# Patient Record
Sex: FEMALE | Race: WHITE | NOT HISPANIC OR LATINO | Employment: OTHER | URBAN - METROPOLITAN AREA
[De-identification: names, ages, dates, MRNs, and addresses within clinical notes are randomized per-mention and may not be internally consistent; named-entity substitution may affect disease eponyms.]

---

## 2019-02-06 ENCOUNTER — OFFICE VISIT (OUTPATIENT)
Dept: GASTROENTEROLOGY | Facility: CLINIC | Age: 69
End: 2019-02-06

## 2019-02-06 VITALS
HEART RATE: 89 BPM | BODY MASS INDEX: 21.33 KG/M2 | SYSTOLIC BLOOD PRESSURE: 118 MMHG | DIASTOLIC BLOOD PRESSURE: 84 MMHG | HEIGHT: 65 IN | WEIGHT: 128 LBS

## 2019-02-06 DIAGNOSIS — K22.70 BARRETT'S ESOPHAGUS WITHOUT DYSPLASIA: Primary | ICD-10-CM

## 2019-02-06 DIAGNOSIS — K59.1 FUNCTIONAL DIARRHEA: ICD-10-CM

## 2019-02-06 PROCEDURE — 99204 OFFICE O/P NEW MOD 45 MIN: CPT | Performed by: INTERNAL MEDICINE

## 2019-02-06 RX ORDER — ESOMEPRAZOLE MAGNESIUM 40 MG/1
40 CAPSULE, DELAYED RELEASE ORAL DAILY
Qty: 90 CAPSULE | Refills: 3 | Status: SHIPPED | OUTPATIENT
Start: 2019-02-06 | End: 2020-03-09 | Stop reason: SDUPTHER

## 2019-02-06 RX ORDER — ALOSETRON HYDROCHLORIDE 0.5 MG/1
0.5 TABLET, FILM COATED ORAL 2 TIMES DAILY
Qty: 60 TABLET | Refills: 11 | Status: SHIPPED | OUTPATIENT
Start: 2019-02-06 | End: 2020-02-06

## 2019-02-06 NOTE — PROGRESS NOTES
PATIENT INFORMATION  Lakeisha Gunn       - 1950    CHIEF COMPLAINT  Chief Complaint   Patient presents with   • Heartburn   • Diarrhea       HISTORY OF PRESENT ILLNESS  Wants second opinion for Diarrhea  And abd pain. And will skip a day here and there but can go 3-5 times a day an at least 4 years.     Has had a colon with biopsy so no IBDand no Microscopic colitis.And ha had HP treated and retested negative.    Post prandial bloating is persistant and has of course taken ABx for her Hp and is now on them for her ear surgery    Has documented Washington's bu is not on meds and has had NCCP that has awoken her from sleep    Reviewed her Path and had persistent inflammation and took Nexium the longest in the past            REVIEW OF SYSTEMS  Review of Systems   Gastrointestinal: Positive for abdominal pain and diarrhea.        Reflux   All other systems reviewed and are negative.        ACTIVE PROBLEMS  There are no active problems to display for this patient.        PAST MEDICAL HISTORY  Past Medical History:   Diagnosis Date   • GERD (gastroesophageal reflux disease)          SURGICAL HISTORY  Past Surgical History:   Procedure Laterality Date   • COLONOSCOPY     • EXTERNAL EAR SURGERY     • NISSEN FUNDOPLICATION           FAMILY HISTORY  Family History   Adopted: Yes         SOCIAL HISTORY  Social History     Occupational History   • Not on file   Tobacco Use   • Smoking status: Never Smoker   • Smokeless tobacco: Never Used   Substance and Sexual Activity   • Alcohol use: Yes   • Drug use: Not on file   • Sexual activity: Not on file       Debilities/Disabilities Identified: None    Emotional Behavior: Appropriate    CURRENT MEDICATIONS    Current Outpatient Medications:   •  alosetron (LOTRONEX) 0.5 MG tablet, Take 1 tablet by mouth 2 (Two) Times a Day., Disp: 60 tablet, Rfl: 11  •  esomeprazole (nexIUM) 40 MG capsule, Take 1 capsule by mouth Daily., Disp: 90 capsule, Rfl: 3    ALLERGIES  Patient has no  "known allergies.    VITALS  Vitals:    02/06/19 0957   BP: 118/84   Pulse: 89   Weight: 58.1 kg (128 lb)   Height: 165.1 cm (65\")       LAST RESULTS   No results found for any previous visit.     No results found.    PHYSICAL EXAM  Physical Exam   Constitutional: She is oriented to person, place, and time. She appears well-developed and well-nourished.   HENT:   Head: Normocephalic and atraumatic.   Eyes: Conjunctivae and EOM are normal. Pupils are equal, round, and reactive to light. No scleral icterus.   Neck: Normal range of motion. Neck supple. No thyromegaly present.   Cardiovascular: Normal rate, regular rhythm, normal heart sounds and intact distal pulses. Exam reveals no gallop.   No murmur heard.  Pulmonary/Chest: Effort normal and breath sounds normal. She has no wheezes. She has no rales.   Abdominal: Soft. Bowel sounds are normal. She exhibits no shifting dullness, no distension, no fluid wave, no abdominal bruit, no ascites and no mass. There is no hepatosplenomegaly. There is tenderness in the right upper quadrant, epigastric area and periumbilical area. There is no guarding and negative Rust's sign. Hernia confirmed negative in the ventral area.       Musculoskeletal: Normal range of motion. She exhibits no edema.   Lymphadenopathy:     She has no cervical adenopathy.   Neurological: She is alert and oriented to person, place, and time.   Skin: Skin is warm and dry. No rash noted. She is not diaphoretic. No erythema.   Psychiatric: She has a normal mood and affect. Her behavior is normal.       ASSESSMENT  Diagnoses and all orders for this visit:    Washington's esophagus without dysplasia    Functional diarrhea    Other orders  -     esomeprazole (nexIUM) 40 MG capsule; Take 1 capsule by mouth Daily.  -     alosetron (LOTRONEX) 0.5 MG tablet; Take 1 tablet by mouth 2 (Two) Times a Day.          PLAN  Return in about 2 months (around 4/6/2019).                          "

## 2019-04-16 ENCOUNTER — OFFICE VISIT (OUTPATIENT)
Dept: GASTROENTEROLOGY | Facility: CLINIC | Age: 69
End: 2019-04-16

## 2019-04-16 VITALS
BODY MASS INDEX: 21.3 KG/M2 | HEART RATE: 80 BPM | DIASTOLIC BLOOD PRESSURE: 89 MMHG | HEIGHT: 65 IN | SYSTOLIC BLOOD PRESSURE: 146 MMHG

## 2019-04-16 DIAGNOSIS — K58.0 IRRITABLE BOWEL SYNDROME WITH DIARRHEA: ICD-10-CM

## 2019-04-16 DIAGNOSIS — K22.70 BARRETT'S ESOPHAGUS WITHOUT DYSPLASIA: Primary | ICD-10-CM

## 2019-04-16 PROCEDURE — 99213 OFFICE O/P EST LOW 20 MIN: CPT | Performed by: INTERNAL MEDICINE

## 2019-04-16 RX ORDER — ESOMEPRAZOLE MAGNESIUM 40 MG/1
40 CAPSULE, DELAYED RELEASE ORAL 2 TIMES DAILY
Qty: 180 CAPSULE | Refills: 3 | Status: SHIPPED | OUTPATIENT
Start: 2019-04-16 | End: 2020-03-09

## 2019-04-16 RX ORDER — ESOMEPRAZOLE MAGNESIUM 40 MG/1
40 CAPSULE, DELAYED RELEASE ORAL 2 TIMES DAILY
Qty: 180 CAPSULE | Refills: 3 | Status: SHIPPED | OUTPATIENT
Start: 2019-04-16 | End: 2020-03-09 | Stop reason: SDUPTHER

## 2019-04-16 NOTE — PROGRESS NOTES
PATIENT INFORMATION  Lakeisha Gunn       - 1950    CHIEF COMPLAINT  Chief Complaint   Patient presents with   • Follow-up     2 mo follow up on Washington's       HISTORY OF PRESENT ILLNESS  Follow up from Washington's and IBS-D , had started daily PPI and Lotronex. Her IBs is better only one bout of Diarrhea. So the respponse to Lotronex has been great.    Her reflux however is down to persistant constant burning and not worse at night or after meals so will trial BID meds for now prior to her EGD    Her last EGd was 2017             REVIEW OF SYSTEMS  Review of Systems   Gastrointestinal:        Reflux   All other systems reviewed and are negative.        ACTIVE PROBLEMS  Patient Active Problem List    Diagnosis   • Washington's esophagus without dysplasia [K22.70]   • Irritable bowel syndrome with diarrhea [K58.0]         PAST MEDICAL HISTORY  Past Medical History:   Diagnosis Date   • GERD (gastroesophageal reflux disease)          SURGICAL HISTORY  Past Surgical History:   Procedure Laterality Date   • COLONOSCOPY     • EXTERNAL EAR SURGERY     • NISSEN FUNDOPLICATION           FAMILY HISTORY  Family History   Adopted: Yes   Problem Relation Age of Onset   • Colon cancer Neg Hx    • Colon polyps Neg Hx          SOCIAL HISTORY  Social History     Occupational History   • Not on file   Tobacco Use   • Smoking status: Never Smoker   • Smokeless tobacco: Never Used   Substance and Sexual Activity   • Alcohol use: Yes   • Drug use: Not on file   • Sexual activity: Not on file       Debilities/Disabilities Identified: None    Emotional Behavior: Appropriate    CURRENT MEDICATIONS    Current Outpatient Medications:   •  alosetron (LOTRONEX) 0.5 MG tablet, Take 1 tablet by mouth 2 (Two) Times a Day., Disp: 60 tablet, Rfl: 11  •  esomeprazole (nexIUM) 40 MG capsule, Take 1 capsule by mouth Daily., Disp: 90 capsule, Rfl: 3  •  esomeprazole (nexIUM) 40 MG capsule, Take 1 capsule by mouth 2 (Two) Times a Day., Disp: 180  "capsule, Rfl: 3  •  esomeprazole (nexIUM) 40 MG capsule, Take 1 capsule by mouth 2 (Two) Times a Day., Disp: 180 capsule, Rfl: 3    ALLERGIES  Patient has no known allergies.    VITALS  Vitals:    04/16/19 0907   BP: 146/89   Pulse: 80   Height: 165.1 cm (65\")       LAST RESULTS   No results found for any previous visit.     No results found.    PHYSICAL EXAM  Physical Exam   Constitutional: She is oriented to person, place, and time. She appears well-developed and well-nourished.   Eyes: Conjunctivae and EOM are normal. Pupils are equal, round, and reactive to light. No scleral icterus.   Neck: Normal range of motion. Neck supple. No thyromegaly present.   Cardiovascular: Normal rate, regular rhythm, normal heart sounds and intact distal pulses. Exam reveals no gallop.   No murmur heard.  Pulmonary/Chest: Effort normal and breath sounds normal. She has no wheezes. She has no rales.   Abdominal: Soft. Bowel sounds are normal. She exhibits no shifting dullness, no distension, no fluid wave, no abdominal bruit, no ascites and no mass. There is no hepatosplenomegaly. There is tenderness in the periumbilical area. There is no guarding and negative Rust's sign. Hernia confirmed negative in the ventral area.   Musculoskeletal: Normal range of motion. She exhibits no edema.   Lymphadenopathy:     She has no cervical adenopathy.   Neurological: She is alert and oriented to person, place, and time.   Skin: Skin is warm and dry. No rash noted. She is not diaphoretic. No erythema.       ASSESSMENT  Diagnoses and all orders for this visit:    Washington's esophagus without dysplasia    Irritable bowel syndrome with diarrhea    Other orders  -     esomeprazole (nexIUM) 40 MG capsule; Take 1 capsule by mouth 2 (Two) Times a Day.  -     esomeprazole (nexIUM) 40 MG capsule; Take 1 capsule by mouth 2 (Two) Times a Day.          PLAN  Her recall for EGD in 2020 unless she fails to respond to her present meds    Return in about 2 " months (around 6/16/2019).

## 2019-06-18 ENCOUNTER — OFFICE VISIT (OUTPATIENT)
Dept: GASTROENTEROLOGY | Facility: CLINIC | Age: 69
End: 2019-06-18

## 2019-06-18 VITALS
BODY MASS INDEX: 21.34 KG/M2 | DIASTOLIC BLOOD PRESSURE: 96 MMHG | HEART RATE: 81 BPM | WEIGHT: 128.09 LBS | HEIGHT: 65 IN | SYSTOLIC BLOOD PRESSURE: 134 MMHG

## 2019-06-18 DIAGNOSIS — K58.0 IRRITABLE BOWEL SYNDROME WITH DIARRHEA: ICD-10-CM

## 2019-06-18 DIAGNOSIS — K22.70 BARRETT'S ESOPHAGUS WITHOUT DYSPLASIA: Primary | ICD-10-CM

## 2019-06-18 PROCEDURE — 99213 OFFICE O/P EST LOW 20 MIN: CPT | Performed by: INTERNAL MEDICINE

## 2019-06-18 NOTE — PROGRESS NOTES
PATIENT INFORMATION  Lakeisha Gunn       - 1950    CHIEF COMPLAINT  Chief Complaint   Patient presents with   • Follow-up     2 mo follow up on Washington's       HISTORY OF PRESENT ILLNESS  Complains of mild constipation on her Lotronex and also no breakthrough since she increased to BID PPI so all is well and no dysphagia            REVIEW OF SYSTEMS  Review of Systems   Gastrointestinal: Positive for constipation.   All other systems reviewed and are negative.        ACTIVE PROBLEMS  Patient Active Problem List    Diagnosis   • Washington's esophagus without dysplasia [K22.70]   • Irritable bowel syndrome with diarrhea [K58.0]         PAST MEDICAL HISTORY  Past Medical History:   Diagnosis Date   • GERD (gastroesophageal reflux disease)          SURGICAL HISTORY  Past Surgical History:   Procedure Laterality Date   • COLONOSCOPY     • EXTERNAL EAR SURGERY     • NISSEN FUNDOPLICATION           FAMILY HISTORY  Family History   Adopted: Yes   Problem Relation Age of Onset   • Colon cancer Neg Hx    • Colon polyps Neg Hx          SOCIAL HISTORY  Social History     Occupational History   • Not on file   Tobacco Use   • Smoking status: Never Smoker   • Smokeless tobacco: Never Used   Substance and Sexual Activity   • Alcohol use: Yes   • Drug use: Not on file   • Sexual activity: Not on file       Debilities/Disabilities Identified: None    Emotional Behavior: Appropriate    CURRENT MEDICATIONS    Current Outpatient Medications:   •  alosetron (LOTRONEX) 0.5 MG tablet, Take 1 tablet by mouth 2 (Two) Times a Day., Disp: 60 tablet, Rfl: 11  •  esomeprazole (nexIUM) 40 MG capsule, Take 1 capsule by mouth Daily., Disp: 90 capsule, Rfl: 3  •  esomeprazole (nexIUM) 40 MG capsule, Take 1 capsule by mouth 2 (Two) Times a Day., Disp: 180 capsule, Rfl: 3  •  esomeprazole (nexIUM) 40 MG capsule, Take 1 capsule by mouth 2 (Two) Times a Day., Disp: 180 capsule, Rfl: 3    ALLERGIES  Patient has no known  "allergies.    VITALS  Vitals:    06/18/19 0848   BP: 134/96   Pulse: 81   Weight: 58.1 kg (128 lb 1.4 oz)   Height: 165.1 cm (65\")       LAST RESULTS   No results found for any previous visit.     No results found.    PHYSICAL EXAM  Physical Exam   Constitutional: She is oriented to person, place, and time. She appears well-developed and well-nourished.   HENT:   Head: Normocephalic and atraumatic.   Eyes: Conjunctivae and EOM are normal. Pupils are equal, round, and reactive to light. No scleral icterus.   Neck: Normal range of motion. Neck supple. No thyromegaly present.   Cardiovascular: Normal rate, regular rhythm, normal heart sounds and intact distal pulses. Exam reveals no gallop.   No murmur heard.  Pulmonary/Chest: Effort normal and breath sounds normal. She has no wheezes. She has no rales.   Abdominal: Soft. Bowel sounds are normal. She exhibits no shifting dullness, no distension, no fluid wave, no abdominal bruit, no ascites and no mass. There is no hepatosplenomegaly. There is no tenderness. There is no guarding and negative Rust's sign. Hernia confirmed negative in the ventral area.   Musculoskeletal: Normal range of motion. She exhibits no edema.   Lymphadenopathy:     She has no cervical adenopathy.   Neurological: She is alert and oriented to person, place, and time.   Skin: Skin is warm and dry. No rash noted. She is not diaphoretic. No erythema.   Psychiatric: She has a normal mood and affect. Her behavior is normal.       ASSESSMENT  Diagnoses and all orders for this visit:    Washington's esophagus without dysplasia    Irritable bowel syndrome with diarrhea          PLAN  Recall in 9/2020 for EGD and her Colonoscopy 10 year recall from 9/2017- 2027    Return if symptoms worsen or fail to improve.                          "

## 2019-08-13 ENCOUNTER — APPOINTMENT (OUTPATIENT)
Dept: WOMENS IMAGING | Facility: HOSPITAL | Age: 69
End: 2019-08-13

## 2019-08-13 PROCEDURE — 77067 SCR MAMMO BI INCL CAD: CPT | Performed by: RADIOLOGY

## 2020-03-09 RX ORDER — ESOMEPRAZOLE MAGNESIUM 40 MG/1
CAPSULE, DELAYED RELEASE ORAL
Qty: 180 CAPSULE | Refills: 3 | Status: SHIPPED | OUTPATIENT
Start: 2020-03-09 | End: 2021-03-01

## 2020-04-20 RX ORDER — ALOSETRON HYDROCHLORIDE 0.5 MG/1
0.5 TABLET, FILM COATED ORAL 2 TIMES DAILY
Qty: 180 TABLET | Refills: 3 | Status: SHIPPED | OUTPATIENT
Start: 2020-04-20 | End: 2021-04-19

## 2020-08-17 ENCOUNTER — TELEPHONE (OUTPATIENT)
Dept: GASTROENTEROLOGY | Facility: CLINIC | Age: 70
End: 2020-08-17

## 2020-08-17 ENCOUNTER — PREP FOR SURGERY (OUTPATIENT)
Dept: OTHER | Facility: HOSPITAL | Age: 70
End: 2020-08-17

## 2020-08-17 DIAGNOSIS — K22.70 BARRETT'S ESOPHAGUS WITHOUT DYSPLASIA: Primary | ICD-10-CM

## 2020-08-17 NOTE — TELEPHONE ENCOUNTER
FAST TRACK (IN MEDIA) - 3 YEAR RECALL - LAST EGD 09/27/2017 - GASTRITIS (?) - SCHEDULE AT [facility].    COLONOSCOPY RECALL 08/2027.

## 2020-08-18 NOTE — TELEPHONE ENCOUNTER
RETURN CALL FROM PATIENT.  SCHEDULED AT [facility] 10/13/2020 AT 2:15PM - ARRIVE 1PM.  WILL MAIL INSTRUCTIONS.    EXPLAINED ABOUT THE COVID TEST ON 10/10/2020, THEN SELF QUARANTINE UNTIL AFTER PROCEDURE.  SHE UNDERSTANDS.

## 2020-09-25 ENCOUNTER — TRANSCRIBE ORDERS (OUTPATIENT)
Dept: SLEEP MEDICINE | Facility: HOSPITAL | Age: 70
End: 2020-09-25

## 2020-09-25 DIAGNOSIS — Z01.818 OTHER SPECIFIED PRE-OPERATIVE EXAMINATION: Primary | ICD-10-CM

## 2020-10-10 ENCOUNTER — LAB (OUTPATIENT)
Dept: LAB | Facility: HOSPITAL | Age: 70
End: 2020-10-10

## 2020-10-10 DIAGNOSIS — Z01.818 OTHER SPECIFIED PRE-OPERATIVE EXAMINATION: ICD-10-CM

## 2020-10-10 PROCEDURE — U0004 COV-19 TEST NON-CDC HGH THRU: HCPCS

## 2020-10-10 PROCEDURE — C9803 HOPD COVID-19 SPEC COLLECT: HCPCS

## 2020-10-12 LAB — SARS-COV-2 RNA RESP QL NAA+PROBE: NOT DETECTED

## 2020-10-13 ENCOUNTER — HOSPITAL ENCOUNTER (OUTPATIENT)
Facility: HOSPITAL | Age: 70
Setting detail: HOSPITAL OUTPATIENT SURGERY
Discharge: HOME | End: 2020-10-13
Attending: INTERNAL MEDICINE | Admitting: INTERNAL MEDICINE

## 2020-10-13 ENCOUNTER — ANESTHESIA (OUTPATIENT)
Dept: GASTROENTEROLOGY | Facility: HOSPITAL | Age: 70
End: 2020-10-13

## 2020-10-13 ENCOUNTER — ANESTHESIA EVENT (OUTPATIENT)
Dept: GASTROENTEROLOGY | Facility: HOSPITAL | Age: 70
End: 2020-10-13

## 2020-10-13 VITALS
HEART RATE: 73 BPM | BODY MASS INDEX: 21.59 KG/M2 | HEIGHT: 65 IN | OXYGEN SATURATION: 98 % | RESPIRATION RATE: 16 BRPM | TEMPERATURE: 97.7 F | WEIGHT: 129.6 LBS | SYSTOLIC BLOOD PRESSURE: 124 MMHG | DIASTOLIC BLOOD PRESSURE: 77 MMHG

## 2020-10-13 DIAGNOSIS — K22.70 BARRETT'S ESOPHAGUS WITHOUT DYSPLASIA: ICD-10-CM

## 2020-10-13 PROCEDURE — 43239 EGD BIOPSY SINGLE/MULTIPLE: CPT | Performed by: INTERNAL MEDICINE

## 2020-10-13 PROCEDURE — 88305 TISSUE EXAM BY PATHOLOGIST: CPT | Performed by: INTERNAL MEDICINE

## 2020-10-13 PROCEDURE — 25010000002 PROPOFOL 10 MG/ML EMULSION: Performed by: ANESTHESIOLOGY

## 2020-10-13 RX ORDER — LIDOCAINE HYDROCHLORIDE 20 MG/ML
INJECTION, SOLUTION INFILTRATION; PERINEURAL AS NEEDED
Status: DISCONTINUED | OUTPATIENT
Start: 2020-10-13 | End: 2020-10-13 | Stop reason: SURG

## 2020-10-13 RX ORDER — SODIUM CHLORIDE 0.9 % (FLUSH) 0.9 %
3 SYRINGE (ML) INJECTION EVERY 12 HOURS SCHEDULED
Status: DISCONTINUED | OUTPATIENT
Start: 2020-10-13 | End: 2020-10-13 | Stop reason: HOSPADM

## 2020-10-13 RX ORDER — PROPOFOL 10 MG/ML
VIAL (ML) INTRAVENOUS CONTINUOUS PRN
Status: DISCONTINUED | OUTPATIENT
Start: 2020-10-13 | End: 2020-10-13 | Stop reason: SURG

## 2020-10-13 RX ORDER — PROPOFOL 10 MG/ML
VIAL (ML) INTRAVENOUS AS NEEDED
Status: DISCONTINUED | OUTPATIENT
Start: 2020-10-13 | End: 2020-10-13 | Stop reason: SURG

## 2020-10-13 RX ORDER — SODIUM CHLORIDE 0.9 % (FLUSH) 0.9 %
10 SYRINGE (ML) INJECTION AS NEEDED
Status: DISCONTINUED | OUTPATIENT
Start: 2020-10-13 | End: 2020-10-13 | Stop reason: HOSPADM

## 2020-10-13 RX ORDER — SODIUM CHLORIDE, SODIUM LACTATE, POTASSIUM CHLORIDE, CALCIUM CHLORIDE 600; 310; 30; 20 MG/100ML; MG/100ML; MG/100ML; MG/100ML
30 INJECTION, SOLUTION INTRAVENOUS CONTINUOUS PRN
Status: DISCONTINUED | OUTPATIENT
Start: 2020-10-13 | End: 2020-10-13 | Stop reason: HOSPADM

## 2020-10-13 RX ORDER — ROSUVASTATIN CALCIUM 5 MG/1
5 TABLET, COATED ORAL NIGHTLY
COMMUNITY
End: 2022-01-12 | Stop reason: SDUPTHER

## 2020-10-13 RX ADMIN — PROPOFOL 100 MG: 10 INJECTION, EMULSION INTRAVENOUS at 14:31

## 2020-10-13 RX ADMIN — LIDOCAINE HYDROCHLORIDE 60 MG: 20 INJECTION, SOLUTION INFILTRATION; PERINEURAL at 14:30

## 2020-10-13 RX ADMIN — PROPOFOL 160 MCG/KG/MIN: 10 INJECTION, EMULSION INTRAVENOUS at 14:32

## 2020-10-13 RX ADMIN — SODIUM CHLORIDE, POTASSIUM CHLORIDE, SODIUM LACTATE AND CALCIUM CHLORIDE 30 ML/HR: 600; 310; 30; 20 INJECTION, SOLUTION INTRAVENOUS at 13:56

## 2020-10-13 NOTE — ANESTHESIA POSTPROCEDURE EVALUATION
"Patient: Lakeisha Gunn    Procedure Summary     Date: 10/13/20 Room / Location:  [facility]    Anesthesia Start: 1428 Anesthesia Stop: 1450    Procedure: ESOPHAGOGASTRODUODENOSCOPY WITH BIOPSIES (N/A Esophagus) Diagnosis:       Washington's esophagus without dysplasia      History of fundoplication      Gastritis      (Washington's esophagus without dysplasia [K22.70])    Surgeon: Noe Salinas MD Provider: Andrea Coburn MD    Anesthesia Type: MAC ASA Status: 2          Anesthesia Type: MAC    Vitals  Vitals Value Taken Time   /81 10/13/20 1459   Temp     Pulse 74 10/13/20 1459   Resp     SpO2 98 % 10/13/20 1459           Post Anesthesia Care and Evaluation    Patient location during evaluation: bedside  Patient participation: complete - patient participated  Level of consciousness: awake and alert  Pain management: adequate  Anesthetic complications: No anesthetic complications    Cardiovascular status: acceptable  Respiratory status: acceptable  Hydration status: acceptable    Comments: /77 (BP Location: Left arm, Patient Position: Lying)   Pulse 73   Temp 36.5 °C (97.7 °F) (Oral)   Resp 16   Ht 165.1 cm (65\")   Wt 58.8 kg (129 lb 9.6 oz)   SpO2 98%   BMI 21.57 kg/m²         "

## 2020-10-13 NOTE — BRIEF OP NOTE
ESOPHAGOGASTRODUODENOSCOPY  Progress Note    Lakeisha Gunn  10/13/2020    Pre-op Diagnosis:   Washington's esophagus without dysplasia [K22.70]       Post-Op Diagnosis Codes:     * Washington's esophagus without dysplasia [K22.70]     * History of fundoplication [Z98.890]     * Gastritis [K29.70]    Procedure/CPT® Codes:        Procedure(s):  ESOPHAGOGASTRODUODENOSCOPY WITH BIOPSIES    Surgeon(s):  Noe Salinas MD    Anesthesia: Monitored Anesthesia Care    Staff:   Endo Technician: Kerri Conteh  Endo Nurse: Tracy Ramirez RN         Estimated Blood Loss: none    Urine Voided: * No values recorded between 10/13/2020  2:26 PM and 10/13/2020  2:43 PM *    Specimens:                Specimens     ID Source Type Tests Collected By Collected At Frozen?      A Stomach Tissue · TISSUE PATHOLOGY EXAM   Noe Salinas MD 10/13/20 1439      Description: GASTRIC BIOPSIES    B Esophagus, Distal Tissue · TISSUE PATHOLOGY EXAM   Noe Salinas MD 10/13/20 1441      Description: DISTAL ESOPHAGUS BIOPSIES                Drains: * No LDAs found *    Findings: Normal Duodenum  Mild gastritis-Biopsy  S/P Fundoplication  SSBE-Biopsy    Complications: None          Noe Salinas MD     Date: 10/13/2020  Time: 14:45 EDT

## 2020-10-13 NOTE — H&P
"Patient Care Team:  Eisenmenger, James Robert, MD as PCP - General (Family Medicine)    CHIEF COMPLAINT: Barretts Esophagus    HISTORY OF PRESENT ILLNESS:  Last EGD was 2017    Past Medical History:   Diagnosis Date   • GERD (gastroesophageal reflux disease)    • Hyperlipidemia      Past Surgical History:   Procedure Laterality Date   • APPENDECTOMY     • COLONOSCOPY     • EAR MASTOIDECTOMY W/ COCHLEAR IMPLANT W/ LANDMARK Left    • EXTERNAL EAR SURGERY     • NISSEN FUNDOPLICATION     • TONSILLECTOMY       Family History   Adopted: Yes   Problem Relation Age of Onset   • Colon cancer Neg Hx    • Colon polyps Neg Hx      Social History     Tobacco Use   • Smoking status: Never Smoker   • Smokeless tobacco: Never Used   Substance Use Topics   • Alcohol use: Yes   • Drug use: Not on file     Medications Prior to Admission   Medication Sig Dispense Refill Last Dose   • alosetron (LOTRONEX) 0.5 MG tablet Take 1 tablet by mouth 2 (Two) Times a Day. 180 tablet 3 10/13/2020 at Unknown time   • esomeprazole (nexIUM) 40 MG capsule TAKE 1 CAPSULE TWICE A  capsule 3 10/13/2020 at Unknown time   • rosuvastatin (CRESTOR) 5 MG tablet Take 5 mg by mouth Every Night.   10/12/2020 at Unknown time     Allergies:  Patient has no known allergies.    REVIEW OF SYSTEMS:  Please see the above history of present illness for pertinent positives and negatives.  The remainder of the patient's systems have been reviewed and are negative.     Vital Signs  Temp:  [97.7 °F (36.5 °C)] 97.7 °F (36.5 °C)  Heart Rate:  [73] 73  Resp:  [16] 16  BP: (122)/(77) 122/77    Flowsheet Rows      First Filed Value   Admission Height  165.1 cm (65\") Documented at 10/13/2020 1343   Admission Weight  58.8 kg (129 lb 9.6 oz) Documented at 10/13/2020 1343           Physical Exam:  Physical Exam   Constitutional: Patient appears well-developed and well-nourished and in no acute distress   HEENT:   Head: Normocephalic and atraumatic.   Eyes:  Pupils are equal, " round, and reactive to light. EOM are intact. Sclerae are anicteric and non-injected.  Mouth and Throat: Patient has moist mucous membranes. Oropharynx is clear of any erythema or exudate.     Neck: Neck supple. No JVD present. No thyromegaly present. No lymphadenopathy present.  Cardiovascular: Regular rate, regular rhythm, S1 normal and S2 normal.  Exam reveals no gallop and no friction rub.  No murmur heard.  Pulmonary/Chest: Lungs are clear to auscultation bilaterally. No respiratory distress. No wheezes. No rhonchi. No rales.   Abdominal: Soft. Bowel sounds are normal. No distension and no mass. There is no hepatosplenomegaly. There is no tenderness.   Musculoskeletal: Normal Muscle tone  Extremities: No edema. Pulses are palpable in all 4 extremities.  Neurological: Patient is alert and oriented to person, place, and time. Cranial nerves II-XII are grossly intact with no focal deficits.  Skin: Skin is warm. No rash noted. Nails show no clubbing.  No cyanosis or erythema.    Debilities/Disabilities Identified: None  Emotional Behavior: Appropriate     Results Review:    I reviewed the patient's new clinical results.  Lab Results (most recent)     None          Imaging Results (Most Recent)     None        reviewed    ECG/EMG Results (most recent)     None        reviewed    Assessment/Plan   Barretts Esophagus/  EGD      I discussed the patients findings and my recommendations with patient.     Noe Salinas MD  10/13/20  13:52 EDT    Time: 10 min prior to procedure.

## 2020-10-13 NOTE — ANESTHESIA PREPROCEDURE EVALUATION
Anesthesia Evaluation     NPO Solid Status: > 8 hours  NPO Liquid Status: > 2 hours           Airway   Mallampati: II  Dental      Pulmonary    Cardiovascular     (+) hyperlipidemia,       Neuro/Psych  GI/Hepatic/Renal/Endo    (+)  GERD,      ROS Comment: Barrets    Musculoskeletal     Abdominal    Substance History      OB/GYN          Other                      Anesthesia Plan    ASA 2     MAC   total IV anesthesia    Anesthetic plan, all risks, benefits, and alternatives have been provided, discussed and informed consent has been obtained with: patient.

## 2020-10-14 LAB
CYTO UR: NORMAL
LAB AP CASE REPORT: NORMAL
PATH REPORT.FINAL DX SPEC: NORMAL
PATH REPORT.GROSS SPEC: NORMAL

## 2021-03-01 RX ORDER — ESOMEPRAZOLE MAGNESIUM 40 MG/1
CAPSULE, DELAYED RELEASE ORAL
Qty: 180 CAPSULE | Refills: 3 | Status: SHIPPED | OUTPATIENT
Start: 2021-03-01 | End: 2022-02-24

## 2021-03-09 DIAGNOSIS — Z23 IMMUNIZATION DUE: ICD-10-CM

## 2021-04-19 RX ORDER — ALOSETRON HYDROCHLORIDE 0.5 MG/1
TABLET, FILM COATED ORAL
Qty: 180 TABLET | Refills: 3 | Status: SHIPPED | OUTPATIENT
Start: 2021-04-19 | End: 2022-09-13 | Stop reason: SDUPTHER

## 2021-09-20 ENCOUNTER — OFFICE VISIT (OUTPATIENT)
Dept: INTERNAL MEDICINE | Facility: CLINIC | Age: 71
End: 2021-09-20

## 2021-09-20 VITALS
DIASTOLIC BLOOD PRESSURE: 80 MMHG | BODY MASS INDEX: 21.73 KG/M2 | RESPIRATION RATE: 16 BRPM | TEMPERATURE: 98.2 F | OXYGEN SATURATION: 97 % | WEIGHT: 130.4 LBS | HEIGHT: 65 IN | HEART RATE: 89 BPM | SYSTOLIC BLOOD PRESSURE: 132 MMHG

## 2021-09-20 DIAGNOSIS — K22.70 BARRETT'S ESOPHAGUS WITHOUT DYSPLASIA: ICD-10-CM

## 2021-09-20 DIAGNOSIS — Z23 NEED FOR 23-POLYVALENT PNEUMOCOCCAL POLYSACCHARIDE VACCINE: ICD-10-CM

## 2021-09-20 DIAGNOSIS — E78.5 HYPERLIPIDEMIA, UNSPECIFIED HYPERLIPIDEMIA TYPE: Primary | ICD-10-CM

## 2021-09-20 DIAGNOSIS — D72.810 LYMPHOPENIA: ICD-10-CM

## 2021-09-20 PROBLEM — K57.90 DIVERTICULAR DISEASE: Status: ACTIVE | Noted: 2017-09-28

## 2021-09-20 PROBLEM — H90.12 CONDUCTIVE HEARING LOSS OF LEFT EAR: Status: ACTIVE | Noted: 2018-08-01

## 2021-09-20 LAB
ALBUMIN SERPL-MCNC: 4.5 G/DL (ref 3.5–5.2)
ALBUMIN/GLOB SERPL: 2.4 G/DL
ALP SERPL-CCNC: 81 U/L (ref 39–117)
ALT SERPL-CCNC: 24 U/L (ref 1–33)
AST SERPL-CCNC: 27 U/L (ref 1–32)
BILIRUB SERPL-MCNC: 0.4 MG/DL (ref 0–1.2)
BUN SERPL-MCNC: 14 MG/DL (ref 8–23)
BUN/CREAT SERPL: 20.3 (ref 7–25)
CALCIUM SERPL-MCNC: 9.7 MG/DL (ref 8.6–10.5)
CHLORIDE SERPL-SCNC: 104 MMOL/L (ref 98–107)
CHOLEST SERPL-MCNC: 187 MG/DL (ref 0–200)
CO2 SERPL-SCNC: 29.6 MMOL/L (ref 22–29)
CREAT SERPL-MCNC: 0.69 MG/DL (ref 0.57–1)
ERYTHROCYTE [DISTWIDTH] IN BLOOD BY AUTOMATED COUNT: 12 % (ref 12.3–15.4)
GLOBULIN SER CALC-MCNC: 1.9 GM/DL
GLUCOSE SERPL-MCNC: 97 MG/DL (ref 65–99)
HCT VFR BLD AUTO: 43.9 % (ref 34–46.6)
HDLC SERPL-MCNC: 62 MG/DL (ref 40–60)
HGB BLD-MCNC: 14.5 G/DL (ref 12–15.9)
LDLC SERPL CALC-MCNC: 98 MG/DL (ref 0–100)
MCH RBC QN AUTO: 32.7 PG (ref 26.6–33)
MCHC RBC AUTO-ENTMCNC: 33 G/DL (ref 31.5–35.7)
MCV RBC AUTO: 99.1 FL (ref 79–97)
PLATELET # BLD AUTO: 202 10*3/MM3 (ref 140–450)
POTASSIUM SERPL-SCNC: 4.7 MMOL/L (ref 3.5–5.2)
PROT SERPL-MCNC: 6.4 G/DL (ref 6–8.5)
RBC # BLD AUTO: 4.43 10*6/MM3 (ref 3.77–5.28)
SODIUM SERPL-SCNC: 140 MMOL/L (ref 136–145)
TRIGL SERPL-MCNC: 158 MG/DL (ref 0–150)
VLDLC SERPL CALC-MCNC: 27 MG/DL (ref 5–40)
WBC # BLD AUTO: 3.58 10*3/MM3 (ref 3.4–10.8)

## 2021-09-20 PROCEDURE — 99204 OFFICE O/P NEW MOD 45 MIN: CPT | Performed by: FAMILY MEDICINE

## 2021-09-20 PROCEDURE — G0009 ADMIN PNEUMOCOCCAL VACCINE: HCPCS | Performed by: FAMILY MEDICINE

## 2021-09-20 PROCEDURE — 90732 PPSV23 VACC 2 YRS+ SUBQ/IM: CPT | Performed by: FAMILY MEDICINE

## 2021-09-20 NOTE — ASSESSMENT & PLAN NOTE
Lipid abnormalities are chronic, started medication a year ago. .  continue current medicaiton as prescribed, check lipid panel today   Lipids will be reassessed in 3 months.

## 2021-09-20 NOTE — PROGRESS NOTES
"Chief Complaint  Establish Care and Hyperlipidemia    Subjective    History of Present Illness {CC  Problem List  Visit  Diagnosis   Encounters  Notes  Medications  Labs  Result Review Imaging  Media :23}     Lakeisha Gunn presents to [facility] for   History of Present Illness   70 yo female present to establish care. 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  She has a history of hyperlipidemia, started medication last year.    Pt has history of Washington disease    Pt denies history of Asthma.  She runs and power walker.   Never had an inhaler.      Dexa scan-osteopenia tried medication did not tolerate, last about 3 years ago.   Mammo- due, off schedule due to covid  Pneumonia-never had it done.   Tdap- years ago unsure  Shinges-not completed  Flu- due        Objective     Vital Signs:   /80 (BP Location: Left arm, Patient Position: Sitting, Cuff Size: Adult)   Pulse 89   Temp 98.2 °F (36.8 °C) (Temporal)   Resp 16   Ht 165.1 cm (65\")   Wt 59.1 kg (130 lb 6.4 oz)   SpO2 97%   BMI 21.70 kg/m²        Physical Exam  Vitals reviewed.   Constitutional:       General: She is not in acute distress.  HENT:      Head: Normocephalic.      Right Ear: Tympanic membrane normal.      Ears:      Comments: Closed ear canal L  Eyes:      Conjunctiva/sclera: Conjunctivae normal.   Cardiovascular:      Rate and Rhythm: Regular rhythm.      Pulses: Normal pulses.      Heart sounds: Normal heart sounds.   Pulmonary:      Effort: Pulmonary effort is normal. No respiratory distress.      Breath sounds: Normal breath sounds. No wheezing.   Abdominal:      General: Bowel sounds are normal. There is no distension.      Palpations: Abdomen is soft.      Tenderness: There is no abdominal tenderness.   Musculoskeletal:      Right lower leg: No edema.      Left lower leg: No edema.   Neurological:      Mental Status: She is alert.   Psychiatric:         Mood and Affect: Mood " normal.        Result Review  Data Reviewed:{ Labs  Result Review  Imaging  Med Tab  Media :23}   The following data was reviewed by: Niki Dennis MD on 09/20/2021  No results for input(s): GLU, BUN, CREATININE, EGFRIFNONA, EGFRIFAFRI, NA, K, CL, CALCIUM, ALBUMIN, BILITOT, ALKPHOS, AST, ALT, CHLPL, TRIG, HDL, VLDL, LDL, LDLHDL, CKTOTAL, HGBA1C, MICROALBUR, WBC, RBC, HB, HCT, MCV, MCH, TSH, FREET4, PSA, INR, [id number], URICACID in the last 39882 hours.    Invalid input(s): PROTEINTOTREF, PLATELETS  obtain labs          Assessment and Plan {CC Problem List  Visit Diagnosis  ROS  Review (Popup)  Personal Medicine Maintenance  Quality  BestPractice  Medications  SmartSets  SnapShot Encounters  Media :23}   Problem List Items Addressed This Visit        Cardiac and Vasculature    Hyperlipidemia - Primary    Current Assessment & Plan     Lipid abnormalities are chronic, started medication a year ago. .  continue current medicaiton as prescribed, check lipid panel today   Lipids will be reassessed in 3 months.         Relevant Orders    Comprehensive metabolic panel    Lipid panel       Gastrointestinal Abdominal     Washington's esophagus without dysplasia    Current Assessment & Plan     Stable per patient,   Continue current medication   Follow up with GI as scheduled.          Relevant Orders    Comprehensive metabolic panel       Hematology and Neoplasia    Lymphopenia    Overview     Pt states years ago issues with a fever, infection.           Current Assessment & Plan     History per patient, will check cbc today.   Feeling well today, states unsure if started after a previous infection years ago.   Obtain previous pcp notes          Relevant Orders    CBC No Differential      Other Visit Diagnoses     Need for 23-polyvalent pneumococcal polysaccharide vaccine        Relevant Medications    pneumococcal polysaccharide 23-valent (PNEUMOVAX-23) vaccine 0.5 mL (Completed)        Pt will get shingles vaccine from  outside pharmacy       Follow Up   Return in about 6 months (around 3/20/2022) for Medicare Wellness.  Patient was given instructions and counseling regarding her condition or for health maintenance advice. Please see specific information pulled into the AVS if appropriate.    EpicAct:MR_WS_AMB_ORDERS,RunParams:STARTUPTYPE=FOLLOW    [email or web address]_DISCHARGE

## 2021-09-20 NOTE — ASSESSMENT & PLAN NOTE
Asthma is denies history. Runner and power walker. .  The patient is experiencing no daytime asthma symptoms. She is experiencing no nighttime asthma symptoms.  In patient history [address]

## 2021-09-20 NOTE — ASSESSMENT & PLAN NOTE
History per patient, will check cbc today.   Feeling well today, states unsure if started after a previous infection years ago.   Obtain previous pcp notes

## 2021-10-07 ENCOUNTER — OFFICE VISIT (OUTPATIENT)
Dept: INTERNAL MEDICINE | Facility: CLINIC | Age: 71
End: 2021-10-07

## 2021-10-07 VITALS
BODY MASS INDEX: 21.99 KG/M2 | HEIGHT: 65 IN | SYSTOLIC BLOOD PRESSURE: 118 MMHG | HEART RATE: 76 BPM | OXYGEN SATURATION: 96 % | WEIGHT: 132 LBS | TEMPERATURE: 97.5 F | DIASTOLIC BLOOD PRESSURE: 78 MMHG

## 2021-10-07 DIAGNOSIS — T78.3XXA ANGIOEDEMA OF LIPS, INITIAL ENCOUNTER: ICD-10-CM

## 2021-10-07 DIAGNOSIS — T78.40XA ALLERGIC REACTION, INITIAL ENCOUNTER: Primary | ICD-10-CM

## 2021-10-07 PROCEDURE — 99213 OFFICE O/P EST LOW 20 MIN: CPT | Performed by: FAMILY MEDICINE

## 2021-10-07 RX ORDER — PREDNISONE 10 MG/1
TABLET ORAL
Qty: 7 TABLET | Refills: 0 | Status: SHIPPED | OUTPATIENT
Start: 2021-10-07 | End: 2022-08-30

## 2021-10-07 RX ORDER — DIPHENHYDRAMINE HCL 25 MG
25 TABLET ORAL EVERY 8 HOURS PRN
Qty: 30 TABLET | Refills: 0 | Status: SHIPPED | OUTPATIENT
Start: 2021-10-07 | End: 2022-08-30

## 2021-10-07 RX ORDER — METHYLPREDNISOLONE SODIUM SUCCINATE 40 MG/ML
40 INJECTION, POWDER, LYOPHILIZED, FOR SOLUTION INTRAMUSCULAR; INTRAVENOUS ONCE
Status: DISCONTINUED | OUTPATIENT
Start: 2021-10-07 | End: 2021-10-07

## 2021-10-07 NOTE — PROGRESS NOTES
"Chief Complaint  Rash    Subjective          Lakeisha Gunn presents to [facility]  History of Present Illness  70 yo female present for evaluation of rash on her face.  Pt states she just return from a trip to [place].  She was staying in a cabin and eating lots of shellfish and fish fresh from the area.  Has not taken any new medication. Denies difficulty breath or swelling in throat. Had a little sore throat a few days ago, resolved at this time. Took an allegra because she was staying in a new environment and sometimes mold bothers her. Rash begin a few days ago while out of town has not taken any benadryl.      Objective   Vital Signs:   /78 (BP Location: Left arm, Patient Position: Sitting, Cuff Size: Adult)   Pulse 76   Temp 97.5 °F (36.4 °C) (Temporal)   Ht 165.1 cm (65\")   Wt 59.9 kg (132 lb)   SpO2 96%   BMI 21.97 kg/m²       Physical Exam  Constitutional:       General: She is not in acute distress.  HENT:      Mouth/Throat:      Mouth: Mucous membranes are moist.      Pharynx: Oropharynx is clear. No posterior oropharyngeal erythema.      Comments: Lips swollen, erythematous  Throat clear, no swelling in throat  Pulmonary:      Effort: No respiratory distress.      Breath sounds: Normal breath sounds.   Skin:     Findings: Rash present.      Comments: Erythematous macules diffusely over entire face   Neurological:      Mental Status: She is alert.        Result Review :              Current Outpatient Medications:   •  alosetron (LOTRONEX) 0.5 MG tablet, TAKE 1 TABLET TWICE A DAY, Disp: 180 tablet, Rfl: 3  •  esomeprazole (nexIUM) 40 MG capsule, TAKE 1 CAPSULE TWICE A DAY, Disp: 180 capsule, Rfl: 3  •  rosuvastatin (CRESTOR) 5 MG tablet, Take 5 mg by mouth Every Night., Disp: , Rfl:   •  diphenhydrAMINE (Benadryl Allergy) 25 MG tablet, Take 1 tablet by mouth Every 8 (Eight) Hours As Needed for Itching (start taking at bedtime only)., Disp: 30 tablet, Rfl: 0  •  " predniSONE (DELTASONE) 10 MG tablet, Take 2 tab for one day, then 1 tab for the next 5 days., Disp: 7 tablet, Rfl: 0  No current facility-administered medications for this visit.     Assessment and Plan    Diagnoses and all orders for this visit:    1. Allergic reaction, initial encounter (Primary)  -     Discontinue: methylPREDNISolone sodium succinate (SOLU-Medrol) injection 40 mg    2. Angioedema of lips, initial encounter    Other orders  -     predniSONE (DELTASONE) 10 MG tablet; Take 2 tab for one day, then 1 tab for the next 5 days.  Dispense: 7 tablet; Refill: 0  -     diphenhydrAMINE (Benadryl Allergy) 25 MG tablet; Take 1 tablet by mouth Every 8 (Eight) Hours As Needed for Itching (start taking at bedtime only).  Dispense: 30 tablet; Refill: 0    Pt advise to take medication as prescribed, Steroids and benadry.  Seek medication attention if worsening of her symptoms or difficulty breathing.    Advise to monitor herself when eating shellfish going forward, as could have new allergy.      Follow Up   Return if symptoms worsen or fail to improve, for Next scheduled follow up.  Patient was given instructions and counseling regarding her condition or for health maintenance advice. Please see specific information pulled into the AVS if appropriate.

## 2021-12-07 ENCOUNTER — APPOINTMENT (OUTPATIENT)
Dept: WOMENS IMAGING | Facility: HOSPITAL | Age: 71
End: 2021-12-07

## 2021-12-07 PROCEDURE — 77067 SCR MAMMO BI INCL CAD: CPT | Performed by: RADIOLOGY

## 2021-12-07 PROCEDURE — 77063 BREAST TOMOSYNTHESIS BI: CPT | Performed by: RADIOLOGY

## 2021-12-21 ENCOUNTER — TELEPHONE (OUTPATIENT)
Dept: GASTROENTEROLOGY | Facility: CLINIC | Age: 71
End: 2021-12-21

## 2021-12-21 NOTE — TELEPHONE ENCOUNTER
Patient called stating that she got a letter in the mail from [employer]. She stated that the pharmacy wrote that the alosetron (LOTRONEX) 0.5 MG tablet as of Jan 1 2022 will no longer be formulary. I advised the patient that she can still request her refill & that we would just have to do a PA.

## 2022-01-12 ENCOUNTER — TELEPHONE (OUTPATIENT)
Dept: INTERNAL MEDICINE | Facility: CLINIC | Age: 72
End: 2022-01-12

## 2022-01-12 RX ORDER — ROSUVASTATIN CALCIUM 5 MG/1
5 TABLET, COATED ORAL NIGHTLY
Qty: 90 TABLET | Refills: 1 | Status: SHIPPED | OUTPATIENT
Start: 2022-01-12 | End: 2022-06-23

## 2022-01-12 NOTE — TELEPHONE ENCOUNTER
Caller: Lakeisha Gunn    Relationship: Self    Best call back number:  [phone]    Requested Prescriptions:   Requested Prescriptions     Pending Prescriptions Disp Refills   • rosuvastatin (CRESTOR) 5 MG tablet       Sig: Take 1 tablet by mouth Every Night.        Pharmacy where request should be sent: [facility] - [address] [phone] [facility] [phone]      Additional details provided by patient: PATIENT IS OUT OF MEDICATION   Does the patient have less than a 3 day supply:  [x] Yes  [] No    Aishwarya Zuniga, Wanda Rep   01/12/22 12:55 EST

## 2022-02-11 ENCOUNTER — TELEPHONE (OUTPATIENT)
Dept: GASTROENTEROLOGY | Facility: CLINIC | Age: 72
End: 2022-02-11

## 2022-02-18 NOTE — TELEPHONE ENCOUNTER
CaseId:[id number];Status:Approved;Review Type:Prior Auth;Coverage Start Date:01/19/2022;Coverage End Date:02/18/2023;    PA Case ID: [id number]

## 2022-02-24 RX ORDER — ESOMEPRAZOLE MAGNESIUM 40 MG/1
CAPSULE, DELAYED RELEASE ORAL
Qty: 180 CAPSULE | Refills: 1 | Status: SHIPPED | OUTPATIENT
Start: 2022-02-24 | End: 2022-08-23

## 2022-05-24 ENCOUNTER — TELEPHONE (OUTPATIENT)
Dept: INTERNAL MEDICINE | Facility: CLINIC | Age: 72
End: 2022-05-24

## 2022-05-24 ENCOUNTER — OFFICE VISIT (OUTPATIENT)
Dept: INTERNAL MEDICINE | Facility: CLINIC | Age: 72
End: 2022-05-24

## 2022-05-24 VITALS
BODY MASS INDEX: 21.99 KG/M2 | HEART RATE: 119 BPM | HEIGHT: 65 IN | DIASTOLIC BLOOD PRESSURE: 62 MMHG | OXYGEN SATURATION: 95 % | SYSTOLIC BLOOD PRESSURE: 118 MMHG | WEIGHT: 132 LBS | TEMPERATURE: 99.1 F

## 2022-05-24 DIAGNOSIS — R52 BODY ACHES: ICD-10-CM

## 2022-05-24 DIAGNOSIS — R50.9 FEVER, UNSPECIFIED FEVER CAUSE: ICD-10-CM

## 2022-05-24 DIAGNOSIS — R51.9 ACUTE INTRACTABLE HEADACHE, UNSPECIFIED HEADACHE TYPE: Primary | ICD-10-CM

## 2022-05-24 LAB
EXPIRATION DATE: NORMAL
INTERNAL CONTROL: NORMAL
Lab: NORMAL
SARS-COV-2 AG UPPER RESP QL IA.RAPID: NOT DETECTED

## 2022-05-24 PROCEDURE — 99213 OFFICE O/P EST LOW 20 MIN: CPT | Performed by: NURSE PRACTITIONER

## 2022-05-24 PROCEDURE — 87426 SARSCOV CORONAVIRUS AG IA: CPT | Performed by: NURSE PRACTITIONER

## 2022-05-24 NOTE — PROGRESS NOTES
Chief Complaint  Headache, Chills, URI, Cough, Fatigue (Started Saturday /), and Nasal Congestion     Subjective:      History of Present Illness {CC  Problem List  Visit  Diagnosis   Encounters  Notes  Medications  Labs  Result Review Imaging  Media :23}     Lakeisha Gunn is a patient of Niki Dennis MD and presents to [facility] for     Covid like symptoms:     Onset: Saturday     Most common symptoms include:  [x] Fever/ chills   [x] Dry cough  [x] Tiredness / fatigue     Less common symptoms:  [x] Body aches and pains  [] Sore throat  [] Diarrhea  [] Conjunctivitis  [] Headache  [] Loss of taste or smell  [] a rash on skin, or discoloration of fingers or toes    Serious symptoms: [x] Denies   [] Difficulty breathing or shortness of breath  [] Chest pain or pressure    Symptom onset:   [x] Gradual   [] Sudden     Symptom progression:   [] Improving  [x] Worsening   [] Unchanged     Known COVID exposure:  [] Yes  [] No  [x] Unknown ( has had a cold)     Vaccinated:   [x] Yes: and boosted on 9/6/21  [] No        I have reviewed patient's medical history, any new submitted information provided by patient or medical assistant and updated medical record.      Objective:      Physical Exam  Vitals reviewed.   Constitutional:       Appearance: Normal appearance. She is well-developed.   HENT:      Head:      Comments: Wearing mask due to COVID      Mouth/Throat:      Pharynx: Posterior oropharyngeal erythema present. No oropharyngeal exudate.   Eyes:      Conjunctiva/sclera: Conjunctivae normal.      Pupils: Pupils are equal, round, and reactive to light.   Neck:      Thyroid: No thyromegaly.   Cardiovascular:      Rate and Rhythm: Regular rhythm.      Pulses: Normal pulses.      Heart sounds: Normal heart sounds.   Pulmonary:      Effort: Pulmonary effort is normal.      Breath sounds: Normal breath sounds. No wheezing or rhonchi.      Comments: E/U  "  Musculoskeletal:      Cervical back: Normal range of motion and neck supple.   Lymphadenopathy:      Cervical: No cervical adenopathy.   Skin:     General: Skin is warm and dry.      Capillary Refill: Capillary refill takes 2 to 3 seconds.   Neurological:      Mental Status: She is alert and oriented to person, place, and time.   Psychiatric:         Mood and Affect: Mood normal.         Behavior: Behavior normal. Behavior is cooperative.         Thought Content: Thought content normal.         Judgment: Judgment normal.        Result Review  Data Reviewed:{ Labs  Result Review  Imaging  Med Tab  Media :23}                POC: Covid antigen negative.     PCR: pending and will be notified of results.     Vital Signs:   /62 (BP Location: Right arm, Patient Position: Sitting, Cuff Size: Adult)   Pulse 119   Temp 99.1 °F (37.3 °C) (Oral)   Ht 165.1 cm (65\")   Wt 59.9 kg (132 lb)   SpO2 95%   BMI 21.97 kg/m²         Requested Prescriptions      No prescriptions requested or ordered in this encounter       Routine medications provided by this office will also be refilled via pharmacy request.       Current Outpatient Medications:   •  alosetron (LOTRONEX) 0.5 MG tablet, TAKE 1 TABLET TWICE A DAY, Disp: 180 tablet, Rfl: 3  •  diphenhydrAMINE (Benadryl Allergy) 25 MG tablet, Take 1 tablet by mouth Every 8 (Eight) Hours As Needed for Itching (start taking at bedtime only)., Disp: 30 tablet, Rfl: 0  •  esomeprazole (nexIUM) 40 MG capsule, TAKE 1 CAPSULE TWICE A DAY, Disp: 180 capsule, Rfl: 1  •  predniSONE (DELTASONE) 10 MG tablet, Take 2 tab for one day, then 1 tab for the next 5 days., Disp: 7 tablet, Rfl: 0  •  rosuvastatin (CRESTOR) 5 MG tablet, Take 1 tablet by mouth Every Night., Disp: 90 tablet, Rfl: 1     Assessment and Plan:      Assessment and Plan {CC Problem List  Visit Diagnosis  ROS  Review (Popup)  Health Maintenance  Quality  BestPractice  Medications  SmartSets  SnapShot " Encounters  Media :23}     Problem List Items Addressed This Visit    None     Visit Diagnoses     Acute intractable headache, unspecified headache type    -  Primary    Relevant Orders    POCT SARS-CoV-2 Antigen RAUL (Completed)    COVID-19,[facility] ROUTINE, NP/OP SWAB IN TRANSPORT MEDIA OR ESWAB 72 HR TAT - Swab, Anterior nasal    Body aches        Relevant Orders    POCT SARS-CoV-2 Antigen RAUL (Completed)    COVID-19,[facility] ROUTINE, NP/OP SWAB IN TRANSPORT MEDIA OR ESWAB 72 HR TAT - Swab, Anterior nasal    Fever, unspecified fever cause        Relevant Orders    COVID-19,[facility] ROUTINE, NP/OP SWAB IN TRANSPORT MEDIA OR ESWAB 72 HR TAT - Swab, Anterior nasal        Based on symptoms: viral etiology likely.   Advised over-the-counter tylenol cold / flu to palliate symptoms.   Drink plenty of fluids.     If symptoms worsen: increase confusion, shortness of breath: go to ER.     She did have nausea yesterday that has improved. If reoccurs, she will let office know.     PCR test results will be reported when returned.    Isolate until symptoms resolved.     Follow Up {Instructions Charge Capture  Follow-up Communications :23}     Return if symptoms worsen or fail to improve.    Follow up with PCP as scheduled.     Patient was given instructions and counseling regarding her condition or for health maintenance advice. Please see specific information pulled into the AVS if appropriate.    Silvia disclaimer:   Much of this encounter note is an electronic transcription/translation of spoken language to printed text. The electronic translation of spoken language may permit erroneous, or at times, nonsensical words or phrases to be inadvertently transcribed; Although I have reviewed the note for such errors, some may still exist.     Additional Patient Counseling:       There are no Patient Instructions on file for this visit.

## 2022-05-25 PROBLEM — U07.1 COVID: Status: ACTIVE | Noted: 2022-05-25

## 2022-05-25 LAB
LABCORP SARS-COV-2, NAA 2 DAY TAT: NORMAL
SARS-COV-2 RNA RESP QL NAA+PROBE: DETECTED

## 2022-05-26 ENCOUNTER — TELEPHONE (OUTPATIENT)
Dept: INTERNAL MEDICINE | Facility: CLINIC | Age: 72
End: 2022-05-26

## 2022-05-26 NOTE — TELEPHONE ENCOUNTER
Caller: Lakeisha Gunn    Relationship: Self    Best call back number: [phone]     What is the best time to reach you: ANYTIME    Who are you requesting to speak with (clinical staff, provider,  specific staff member): JOANNE CALDWELL/CLINICAL    What was the call regarding: PATIENT IS CALLING STATING THAT HER TEMPERATURE IS BACK UP, AND .8 TODAY, AND SHE HAS A HEADACHE.  SHE WOULD  LIKE TO KNOW IF SHE CAN GET THE MONOCLONAL ANTIBODY MEDICATION.  PLEASE CONTACT PATIENT TO ADVISE.     Do you require a callback: YES

## 2022-05-27 ENCOUNTER — TELEPHONE (OUTPATIENT)
Dept: INTERNAL MEDICINE | Facility: CLINIC | Age: 72
End: 2022-05-27

## 2022-05-27 NOTE — TELEPHONE ENCOUNTER
HUB has transferred patient's phone call due to no quick response yesterday, she states she still has headache, still having fever, chest now burns and asking if she can possibly get one of the infusions. She has a lot of questions.  I informed patient to be sure she is rotating ibuprofen and tylenol, that it is ok to take the mucinex DM but as well there is a Mucinex syrup severe cough and congestion, also stay hydrated.  That the infusion usually have to be done within a 5 day window of on set symptoms.  Please advise    01-Jun-2021

## 2022-06-23 RX ORDER — ROSUVASTATIN CALCIUM 5 MG/1
TABLET, COATED ORAL
Qty: 90 TABLET | Refills: 1 | Status: SHIPPED | OUTPATIENT
Start: 2022-06-23

## 2022-08-23 RX ORDER — ESOMEPRAZOLE MAGNESIUM 40 MG/1
CAPSULE, DELAYED RELEASE ORAL
Qty: 60 CAPSULE | Refills: 0 | Status: SHIPPED | OUTPATIENT
Start: 2022-08-23

## 2022-08-30 ENCOUNTER — OFFICE VISIT (OUTPATIENT)
Dept: GASTROENTEROLOGY | Facility: CLINIC | Age: 72
End: 2022-08-30

## 2022-08-30 VITALS
BODY MASS INDEX: 20.71 KG/M2 | DIASTOLIC BLOOD PRESSURE: 70 MMHG | WEIGHT: 124.3 LBS | SYSTOLIC BLOOD PRESSURE: 102 MMHG | HEIGHT: 65 IN

## 2022-08-30 DIAGNOSIS — K22.70 BARRETT'S ESOPHAGUS WITHOUT DYSPLASIA: ICD-10-CM

## 2022-08-30 DIAGNOSIS — K64.9 HEMORRHOIDS, UNSPECIFIED HEMORRHOID TYPE: ICD-10-CM

## 2022-08-30 DIAGNOSIS — K58.0 IRRITABLE BOWEL SYNDROME WITH DIARRHEA: Primary | ICD-10-CM

## 2022-08-30 PROCEDURE — 99213 OFFICE O/P EST LOW 20 MIN: CPT | Performed by: INTERNAL MEDICINE

## 2022-08-30 NOTE — PROGRESS NOTES
PATIENT INFORMATION  Lakeisha Gunn       - 1950    CHIEF COMPLAINT  Chief Complaint   Patient presents with   • Washington's esophagus   • Irritable Bowel Syndrome   • Diarrhea       HISTORY OF PRESENT ILLNESS  Her for IBS and GERD/ Washington's    Rare unpredictable post prandial bloating worse after salads but otherwise doing well     Her complaints is of an external hemolrrhoid from her mild constipation    Has Prep H and Abx ointment.     Justin lhave her decrease her alosetron to daily and can hold but she is reticent      REVIEWED PERTINENT RESULTS/ LABS  Lab Results   Component Value Date    CASEREPORT  10/13/2020     Surgical Pathology Report                         Case: [id number]                                  Authorizing Provider:  Noe Salinas        Collected:           10/13/2020 02:39 PM                                 MD Larry                                                                   Ordering Location:     [facility]  Received:            10/13/2020 03:25 PM                                 ENDO SUITES                                                                  Pathologist:           Samuel Christie MD                                                         Specimens:   1) - Stomach, GASTRIC BIOPSIES                                                                      2) - Esophagus, Distal, DISTAL ESOPHAGUS BIOPSIES                                          FINALDX  10/13/2020     1. Stomach, Biopsy: Benign gastric mucosa with   A. Intestinal metaplasia with no dysplasia.   B. Mild chronic inflammation.   C. No Helicobacter pylori.    2. Esophagus, Distal, Biopsy: Benign squamous and gastric mucosa with    A. Chronic inflammation in the gastric mucosa.   B. No intestinal metaplasia.   C. No Helicobacter pylori.    elvin/jse        Lab Results   Component Value Date    HGB 14.5 2021    MCV 99.1 (H) 2021     2021    ALT 24 2021     AST 27 09/20/2021    TRIG 158 (H) 09/20/2021      No results found.    REVIEW OF SYSTEMS  Review of Systems   Constitutional: Negative for activity change, chills, fever and unexpected weight change.   HENT: Negative for congestion.    Eyes: Negative for visual disturbance.   Respiratory: Negative for shortness of breath.    Cardiovascular: Negative for chest pain and palpitations.   Gastrointestinal: Positive for abdominal distention, anal bleeding (hemorrhoid) and constipation. Negative for abdominal pain and blood in stool.   Endocrine: Negative for cold intolerance and heat intolerance.   Genitourinary: Negative for hematuria.   Musculoskeletal: Negative for gait problem.   Skin: Negative for color change.   Allergic/Immunologic: Negative for immunocompromised state.   Neurological: Negative for weakness and light-headedness.   Hematological: Negative for adenopathy.   Psychiatric/Behavioral: Negative for sleep disturbance. The patient is not nervous/anxious.          ACTIVE PROBLEMS  Patient Active Problem List    Diagnosis    • COVID [U07.1]    • Lymphopenia [D72.810]    • Anna's esophagus without dysplasia [K22.70]    • Irritable bowel syndrome with diarrhea [K58.0]    • Conductive hearing loss of left ear [H90.12]    • Diverticular disease [K57.90]    • Asthma [J45.909]    • Hyperlipidemia [E78.5]          PAST MEDICAL HISTORY  Past Medical History:   Diagnosis Date   • GERD (gastroesophageal reflux disease)    • Hyperlipidemia          SURGICAL HISTORY  Past Surgical History:   Procedure Laterality Date   • APPENDECTOMY     • COLONOSCOPY     • EAR MASTOIDECTOMY W/ COCHLEAR IMPLANT W/ LANDMARK Left    • ENDOSCOPY N/A 10/13/2020    Procedure: ESOPHAGOGASTRODUODENOSCOPY WITH BIOPSIES;  Surgeon: Noe Salinas MD;  Location: [facility] ENDOSCOPY;  Service: Gastroenterology;  Laterality: N/A;  PRE OP - h/O Anna'S  POST OP - ANNA'S,  FUNDAL PLICATION GASTRITIS   • EXTERNAL EAR SURGERY     •  "NISSEN FUNDOPLICATION     • TONSILLECTOMY           FAMILY HISTORY  Family History   Adopted: Yes   Problem Relation Age of Onset   • Colon cancer Neg Hx    • Colon polyps Neg Hx          SOCIAL HISTORY  Social History     Occupational History   • Not on file   Tobacco Use   • Smoking status: Never Smoker   • Smokeless tobacco: Never Used   Vaping Use   • Vaping Use: Never used   Substance and Sexual Activity   • Alcohol use: Yes   • Drug use: Defer   • Sexual activity: Defer         CURRENT MEDICATIONS    Current Outpatient Medications:   •  alosetron (LOTRONEX) 0.5 MG tablet, TAKE 1 TABLET TWICE A DAY, Disp: 180 tablet, Rfl: 3  •  esomeprazole (nexIUM) 40 MG capsule, TAKE 1 CAPSULE TWICE A DAY, Disp: 60 capsule, Rfl: 0  •  rosuvastatin (CRESTOR) 5 MG tablet, TAKE 1 TABLET EVERY NIGHT, Disp: 90 tablet, Rfl: 1  •  hydrocortisone 2.5 % ointment, Apply 1 application topically to the appropriate area as directed 2 (Two) Times a Day., Disp: 30 g, Rfl: 11    ALLERGIES  Patient has no known allergies.    VITALS  Vitals:    08/30/22 1116   BP: 102/70   BP Location: Left arm   Patient Position: Sitting   Cuff Size: Large Adult   Weight: 56.4 kg (124 lb 4.8 oz)   Height: 165.1 cm (65\")       PHYSICAL EXAM  Debilities/Disabilities Identified: None  Emotional Behavior: Appropriate  Wt Readings from Last 3 Encounters:   08/30/22 56.4 kg (124 lb 4.8 oz)   05/24/22 59.9 kg (132 lb)   10/07/21 59.9 kg (132 lb)     Ht Readings from Last 1 Encounters:   08/30/22 165.1 cm (65\")     Body mass index is 20.68 kg/m².  Physical Exam  Constitutional:       Appearance: She is well-developed. She is not diaphoretic.   HENT:      Head: Normocephalic and atraumatic.   Eyes:      General: No scleral icterus.     Conjunctiva/sclera: Conjunctivae normal.      Pupils: Pupils are equal, round, and reactive to light.   Neck:      Thyroid: No thyromegaly.   Cardiovascular:      Rate and Rhythm: Normal rate and regular rhythm.      Heart sounds: " Normal heart sounds. No murmur heard.    No gallop.   Pulmonary:      Effort: Pulmonary effort is normal.      Breath sounds: Normal breath sounds. No wheezing or rales.   Abdominal:      General: Bowel sounds are normal. There is no distension or abdominal bruit.      Palpations: Abdomen is soft. There is no shifting dullness, fluid wave or mass.      Tenderness: There is no abdominal tenderness. There is no guarding. Negative signs include Rust's sign.      Hernia: There is no hernia in the ventral area.   Musculoskeletal:         General: Normal range of motion.      Cervical back: Normal range of motion and neck supple.   Lymphadenopathy:      Cervical: No cervical adenopathy.   Skin:     General: Skin is warm and dry.      Findings: No erythema or rash.   Neurological:      Mental Status: She is alert and oriented to person, place, and time.   Psychiatric:         Mood and Affect: Mood normal.         Behavior: Behavior normal.         CLINICAL DATA REVIEWED   reviewed previous lab results and integrated with today's visit, reviewed notes from other physicians and/or last GI encounter, reviewed previous endoscopy results and available photos, reviewed surgical pathology results from previous biopsies    ASSESSMENT  Diagnoses and all orders for this visit:    Irritable bowel syndrome with diarrhea    Hemorrhoids, unspecified hemorrhoid type    Washington's esophagus without dysplasia    Other orders  -     hydrocortisone 2.5 % ointment; Apply 1 application topically to the appropriate area as directed 2 (Two) Times a Day.          PLAN  Return in about 3 months (around 11/30/2022).    I have discussed the above plan with the patient.  They verbalize understanding and are in agreement with the plan.  They have been advised to contact the office for any questions, concerns, or changes related to their health.

## 2022-09-13 RX ORDER — ALOSETRON HYDROCHLORIDE 0.5 MG/1
0.5 TABLET, FILM COATED ORAL 2 TIMES DAILY
Qty: 180 TABLET | Refills: 3 | Status: SHIPPED | OUTPATIENT
Start: 2022-09-13 | End: 2022-09-14 | Stop reason: SDUPTHER

## 2022-09-13 NOTE — TELEPHONE ENCOUNTER
Per LOV: 08/30/22    (Justin shipley her decrease her alosetron to daily and can hold but she is reticent)

## 2022-09-14 RX ORDER — ALOSETRON HYDROCHLORIDE 0.5 MG/1
0.5 TABLET, FILM COATED ORAL 2 TIMES DAILY
Qty: 180 TABLET | Refills: 3 | Status: SHIPPED | OUTPATIENT
Start: 2022-09-14

## 2022-09-22 ENCOUNTER — OFFICE VISIT (OUTPATIENT)
Dept: INTERNAL MEDICINE | Facility: CLINIC | Age: 72
End: 2022-09-22

## 2022-09-22 VITALS
WEIGHT: 125.6 LBS | DIASTOLIC BLOOD PRESSURE: 78 MMHG | HEIGHT: 65 IN | SYSTOLIC BLOOD PRESSURE: 123 MMHG | BODY MASS INDEX: 20.93 KG/M2 | OXYGEN SATURATION: 96 % | TEMPERATURE: 97.7 F | HEART RATE: 79 BPM

## 2022-09-22 DIAGNOSIS — Z11.59 ENCOUNTER FOR HEPATITIS C SCREENING TEST FOR LOW RISK PATIENT: ICD-10-CM

## 2022-09-22 DIAGNOSIS — E78.5 HYPERLIPIDEMIA, UNSPECIFIED HYPERLIPIDEMIA TYPE: ICD-10-CM

## 2022-09-22 DIAGNOSIS — R53.83 FATIGUE, UNSPECIFIED TYPE: ICD-10-CM

## 2022-09-22 DIAGNOSIS — Z78.0 POST-MENOPAUSAL: ICD-10-CM

## 2022-09-22 DIAGNOSIS — Z00.00 MEDICARE ANNUAL WELLNESS VISIT, SUBSEQUENT: Primary | ICD-10-CM

## 2022-09-22 DIAGNOSIS — Z12.31 ENCOUNTER FOR SCREENING MAMMOGRAM FOR BREAST CANCER: ICD-10-CM

## 2022-09-22 PROCEDURE — 1160F RVW MEDS BY RX/DR IN RCRD: CPT | Performed by: FAMILY MEDICINE

## 2022-09-22 PROCEDURE — 1170F FXNL STATUS ASSESSED: CPT | Performed by: FAMILY MEDICINE

## 2022-09-22 PROCEDURE — G0439 PPPS, SUBSEQ VISIT: HCPCS | Performed by: FAMILY MEDICINE

## 2022-09-22 PROCEDURE — 1126F AMNT PAIN NOTED NONE PRSNT: CPT | Performed by: FAMILY MEDICINE

## 2022-09-22 NOTE — ASSESSMENT & PLAN NOTE
Lipid abnormalities are chronic.  chronic    Monitor lipid panel  Continue current medication as prescribed adjust if indicated with lab results.   Lipids will be reassessed in 3 months.

## 2022-09-22 NOTE — PROGRESS NOTES
The ABCs of the Annual Wellness Visit  Subsequent Medicare Wellness Visit    Chief Complaint   Patient presents with   • Medicare Wellness-subsequent      Subjective    History of Present Illness:  Lakeisha Gunn is a 72 y.o. female who presents for a Subsequent Medicare Wellness Visit.    The following portions of the patient's history were reviewed and   updated as appropriate: allergies, current medications, past family history, past medical history, past social history, past surgical history and problem list.    Compared to one year ago, the patient feels her physical   health is better. States she has felt tired since having Covid in May.     Compared to one year ago, the patient feels her mental   health is the same.    Recent Hospitalizations:  She was not admitted to the hospital during the last year.       Current Medical Providers:  Patient Care Team:  Niki Dennis MD as PCP - General (Family Medicine)  Rita, Noe Juarez MD as Consulting Physician (Gastroenterology)    Outpatient Medications Prior to Visit   Medication Sig Dispense Refill   • alosetron (LOTRONEX) 0.5 MG tablet Take 1 tablet by mouth 2 (Two) Times a Day. 180 tablet 3   • esomeprazole (nexIUM) 40 MG capsule TAKE 1 CAPSULE TWICE A DAY 60 capsule 0   • hydrocortisone 2.5 % ointment Apply 1 application topically to the appropriate area as directed 2 (Two) Times a Day. 30 g 11   • rosuvastatin (CRESTOR) 5 MG tablet TAKE 1 TABLET EVERY NIGHT 90 tablet 1     No facility-administered medications prior to visit.       No opioid medication identified on active medication list. I have reviewed chart for other potential  high risk medication/s and harmful drug interactions in the elderly.          Aspirin is not on active medication list.  Aspirin use is not indicated based on review of current medical condition/s. Risk of harm outweighs potential benefits.  .    Patient Active Problem List   Diagnosis   • Washington's esophagus without  "dysplasia   • Irritable bowel syndrome with diarrhea   • Hyperlipidemia   • Diverticular disease   • Conductive hearing loss of left ear   • Asthma   • Lymphopenia   • COVID     Advance Care Planning  Advance Directive is not on file.  ACP discussion was held with the patient during this visit. Patient has an advance directive (not in EMR), copy requested.    Review of Systems   Constitutional: Negative for chills and fever.   HENT: Negative for congestion, rhinorrhea and sneezing.    Eyes: Negative for visual disturbance.   Respiratory: Positive for shortness of breath. Negative for cough.    Cardiovascular: Negative for chest pain and leg swelling.   Gastrointestinal: Negative for abdominal pain, constipation, diarrhea, nausea and vomiting.   Genitourinary: Negative for difficulty urinating, vaginal bleeding and vaginal discharge.   Musculoskeletal: Negative for arthralgias and back pain.   Skin: Negative for rash.   Neurological: Negative for dizziness.   Hematological: Does not bruise/bleed easily.   Psychiatric/Behavioral: Negative for dysphoric mood and sleep disturbance.        Objective    Vitals:    09/22/22 0758   BP: 123/78   BP Location: Left arm   Patient Position: Sitting   Cuff Size: Adult   Pulse: 79   Temp: 97.7 °F (36.5 °C)   TempSrc: Temporal   SpO2: 96%   Weight: 57 kg (125 lb 9.6 oz)   Height: 165.1 cm (65\")   PainSc: 0-No pain     Estimated body mass index is 20.9 kg/m² as calculated from the following:    Height as of this encounter: 165.1 cm (65\").    Weight as of this encounter: 57 kg (125 lb 9.6 oz).    BMI is within normal parameters. No other follow-up for BMI required.      Does the patient have evidence of cognitive impairment? No    Physical Exam  Constitutional:       General: She is not in acute distress.     Appearance: She is not ill-appearing.   HENT:      Head: Normocephalic.      Right Ear: Tympanic membrane normal.      Ears:      Comments: L ear     Mouth/Throat:      Mouth: " Mucous membranes are moist.      Pharynx: No oropharyngeal exudate or posterior oropharyngeal erythema.   Eyes:      Conjunctiva/sclera: Conjunctivae normal.   Cardiovascular:      Rate and Rhythm: Regular rhythm.      Heart sounds: Normal heart sounds.   Pulmonary:      Effort: No respiratory distress.      Breath sounds: Normal breath sounds. No wheezing.   Abdominal:      Palpations: Abdomen is soft.      Tenderness: There is no abdominal tenderness.   Musculoskeletal:      Cervical back: Neck supple.      Right lower leg: No edema.      Left lower leg: No edema.   Neurological:      Mental Status: She is alert and oriented to person, place, and time.   Psychiatric:         Mood and Affect: Mood normal.                HEALTH RISK ASSESSMENT    Smoking Status:  Social History     Tobacco Use   Smoking Status Never Smoker   Smokeless Tobacco Never Used     Alcohol Consumption:  Social History     Substance and Sexual Activity   Alcohol Use Yes     Fall Risk Screen:    STEADI Fall Risk Assessment was completed, and patient is at LOW risk for falls.Assessment completed on:9/22/2022    Depression Screening:  PHQ-2/PHQ-9 Depression Screening 9/22/2022   Little Interest or Pleasure in Doing Things 0-->not at all   Feeling Down, Depressed or Hopeless 0-->not at all   PHQ-9: Brief Depression Severity Measure Score 0       Health Habits and Functional and Cognitive Screening:  Functional & Cognitive Status 9/22/2022   Do you have difficulty preparing food and eating? No   Do you have difficulty bathing yourself, getting dressed or grooming yourself? No   Do you have difficulty using the toilet? No   Do you have difficulty moving around from place to place? No   Do you have trouble with steps or getting out of a bed or a chair? No   Current Diet Well Balanced Diet   Dental Exam Up to date   Eye Exam Up to date   Exercise (times per week) 4 times per week   Current Exercises Include Walking   Do you need help using the  phone?  No   Are you deaf or do you have serious difficulty hearing?  Yes   Do you need help with transportation? No   Do you need help shopping? No   Do you need help preparing meals?  No   Do you need help with housework?  No   Do you need help with laundry? No   Do you need help taking your medications? No   Do you need help managing money? No   Do you ever drive or ride in a car without wearing a seat belt? No   Have you felt unusual stress, anger or loneliness in the last month? No   Who do you live with? Spouse   If you need help, do you have trouble finding someone available to you? No   Have you been bothered in the last four weeks by sexual problems? No   Do you have difficulty concentrating, remembering or making decisions? No       Age-appropriate Screening Schedule:  Refer to the list below for future screening recommendations based on patient's age, sex and/or medical conditions. Orders for these recommended tests are listed in the plan section. The patient has been provided with a written plan.    Health Maintenance   Topic Date Due   • MAMMOGRAM  Never done   • DXA SCAN  Never done   • TDAP/TD VACCINES (1 - Tdap) Never done   • ZOSTER VACCINE (1 of 2) Never done   • INFLUENZA VACCINE  10/01/2022   • LIPID PANEL  09/22/2023              Assessment & Plan   CMS Preventative Services Quick Reference  Risk Factors Identified During Encounter  Immunizations Discussed/Encouraged (specific Immunizations; Tdap and Shingrix  The above risks/problems have been discussed with the patient.  Follow up actions/plans if indicated are seen below in the Assessment/Plan Section.  Pertinent information has been shared with the patient in the After Visit Summary.    Diagnoses and all orders for this visit:    1. Medicare annual wellness visit, subsequent (Primary)    2. Fatigue, unspecified type  -     Vitamin B12  -     TSH  -     CBC No Differential    3. Post-menopausal  -     DEXA Bone Density, Axial (Hospital);  Future    4. Hyperlipidemia, unspecified hyperlipidemia type  Assessment & Plan:  Lipid abnormalities are chronic.  chronic    Monitor lipid panel  Continue current medication as prescribed adjust if indicated with lab results.   Lipids will be reassessed in 3 months.    Orders:  -     Comprehensive metabolic panel  -     Lipid panel    5. Encounter for screening mammogram for breast cancer  -     Mammo Screening, Bilateral with CAD (Annually); Future    6. Encounter for hepatitis C screening test for low risk patient  -     Hepatitis C antibody    Other orders  -     Hepatitis C Antibody      Follow Up:   Return in about 6 months (around 3/22/2023).     An After Visit Summary and PPPS were made available to the patient.

## 2022-09-22 NOTE — PATIENT INSTRUCTIONS
Advance Care Planning and Advance Directives     You make decisions on a daily basis - decisions about where you want to live, your career, your home, your life. Perhaps one of the most important decisions you face is your choice for future medical care. Take time to talk with your family and your healthcare team and start planning today.  Advance Care Planning is a process that can help you:  · Understand possible future healthcare decisions in light of your own experiences  · Reflect on those decision in light of your goals and values  · Discuss your decisions with those closest to you and the healthcare professionals that care for you  · Make a plan by creating a document that reflects your wishes    Surrogate Decision Maker  In the event of a medical emergency, which has left you unable to communicate or to make your own decisions, you would need someone to make decisions for you.  It is important to discuss your preferences for medical treatment with this person while you are in good health.     Qualities of a surrogate decision maker:  • Willing to take on this role and responsibility  • Knows what you want for future medical care  • Willing to follow your wishes even if they don't agree with them  • Able to make difficult medical decisions under stressful circumstances    Advance Directives  These are legal documents you can create that will guide your healthcare team and decision maker(s) when needed. These documents can be stored in the electronic medical record.    · Living Will - a legal document to guide your care if you have a terminal condition or a serious illness and are unable to communicate. States vary by statute in document names/types, but most forms may include one or more of the following:        -  Directions regarding life-prolonging treatments        -  Directions regarding artificially provided nutrition/hydration        -  Choosing a healthcare decision maker        -  Direction  regarding organ/tissue donation    · Durable Power of  for Healthcare - this document names an -in-fact to make medical decisions for you, but it may also allow this person to make personal and financial decisions for you. Please seek the advice of an  if you need this type of document.    **Advance Directives are not required and no one may discriminate against you if you do not sign one.    Medical Orders  Many states allow specific forms/orders signed by your physician to record your wishes for medical treatment in your current state of health. This form, signed in personal communication with your physician, addresses resuscitation and other medical interventions that you may or may not want.      For more information or to schedule a time with a [facility] Advance Care Planning Facilitator contact: [facility] or call [phone] and someone will contact you directly.  You are due for adacel Tdap vaccination. (provides protection against tetanus, diptheria and whooping cough) Please  get the immunization at your local pharmacy at your earliest convenience. This immunization will next be due in 10 years. Please click on the link for more information about this vaccine.    [facility] Tdap Vaccine Information    You are due for Shingrix vaccination series ( the newest shingles vaccine).  It is a two shot series spaced 2-6 months apart. Please get this vaccine series started at your earliest convenience at your local pharmacy to help avoid shingles outbreak. It is more effective than the old Zostavax vaccine and is recommended even if you have had the Zostavax vaccine in the past.  Once the Shingrix series is completed, it does not need to be repeated.   For more information, please look at the website below:  [facility] Shingrix Vaccine Information      Medicare Wellness  Personal Prevention Plan of Service     Date of Office Visit:    Encounter Provider:  Niki Dennis MD  Place of  Service:  [facility] PRIMARY CARE  Patient Name: Lakeisha Gunn  :  1950    As part of the Medicare Wellness portion of your visit today, we are providing you with this personalized preventive plan of services (PPPS). This plan is based upon recommendations of the [place] Preventive Services Task Force (USPSTF) and the Advisory Committee on Immunization Practices (ACIP).    This lists the preventive care services that should be considered, and provides dates of when you are due. Items listed as completed are up-to-date and do not require any further intervention.    Health Maintenance   Topic Date Due   • MAMMOGRAM  Never done   • DXA SCAN  Never done   • TDAP/TD VACCINES (1 - Tdap) Never done   • ZOSTER VACCINE (1 of 2) Never done   • HEPATITIS C SCREENING  Never done   • GASTROSCOPY (EGD)  2020   • LIPID PANEL  2022   • COVID-19 Vaccine (5 - Booster for Pfizer series) 2022   • INFLUENZA VACCINE  10/01/2022   • ANNUAL WELLNESS VISIT  2023   • COLORECTAL CANCER SCREENING  2027   • Pneumococcal Vaccine 65+  Completed       Orders Placed This Encounter   Procedures   • Mammo Screening, Bilateral with CAD (Annually)     Use HCPCS/CPT Code 93826     Standing Status:   Future     Standing Expiration Date:   2023     Order Specific Question:   Reason for Exam:     Answer:   screen for breast cancer   • DEXA Bone Density, Axial (Hospital)     Standing Status:   Future     Standing Expiration Date:   2023     Order Specific Question:   Is patient taking or have taken long term Glucocorticoid (steroids)?     Answer:   No     Order Specific Question:   Does the patient have rheumatoid arthritis?     Answer:   No     Order Specific Question:   Does the patient have secondary osteoporosis?     Answer:   No     Order Specific Question:   Reason for Exam:     Answer:   post menopausal     Order Specific Question:   Release to patient     Answer:   Routine  Release   • Comprehensive metabolic panel     Order Specific Question:   Release to patient     Answer:   Routine Release   • Vitamin B12     Order Specific Question:   Release to patient     Answer:   Routine Release   • TSH     Order Specific Question:   Release to patient     Answer:   Routine Release   • Lipid panel     Order Specific Question:   Release to patient     Answer:   Routine Release   • CBC No Differential     Order Specific Question:   Release to patient     Answer:   Routine Release       No follow-ups on file.

## 2022-09-23 LAB
ALBUMIN SERPL-MCNC: 4.5 G/DL (ref 3.5–5.2)
ALBUMIN/GLOB SERPL: 2.4 G/DL
ALP SERPL-CCNC: 77 U/L (ref 39–117)
ALT SERPL-CCNC: 22 U/L (ref 1–33)
AST SERPL-CCNC: 21 U/L (ref 1–32)
BILIRUB SERPL-MCNC: 0.5 MG/DL (ref 0–1.2)
BUN SERPL-MCNC: 15 MG/DL (ref 8–23)
BUN/CREAT SERPL: 20.5 (ref 7–25)
CALCIUM SERPL-MCNC: 9.3 MG/DL (ref 8.6–10.5)
CHLORIDE SERPL-SCNC: 104 MMOL/L (ref 98–107)
CHOLEST SERPL-MCNC: 206 MG/DL (ref 0–200)
CO2 SERPL-SCNC: 32.8 MMOL/L (ref 22–29)
CREAT SERPL-MCNC: 0.73 MG/DL (ref 0.57–1)
EGFRCR SERPLBLD CKD-EPI 2021: 87 ML/MIN/1.73
ERYTHROCYTE [DISTWIDTH] IN BLOOD BY AUTOMATED COUNT: 12.3 % (ref 12.3–15.4)
GLOBULIN SER CALC-MCNC: 1.9 GM/DL
GLUCOSE SERPL-MCNC: 93 MG/DL (ref 65–99)
HCT VFR BLD AUTO: 44.1 % (ref 34–46.6)
HCV AB S/CO SERPL IA: <0.1 S/CO RATIO (ref 0–0.9)
HDLC SERPL-MCNC: 69 MG/DL (ref 40–60)
HGB BLD-MCNC: 14.5 G/DL (ref 12–15.9)
LDLC SERPL CALC-MCNC: 115 MG/DL (ref 0–100)
MCH RBC QN AUTO: 32.8 PG (ref 26.6–33)
MCHC RBC AUTO-ENTMCNC: 32.9 G/DL (ref 31.5–35.7)
MCV RBC AUTO: 99.8 FL (ref 79–97)
PLATELET # BLD AUTO: 202 10*3/MM3 (ref 140–450)
POTASSIUM SERPL-SCNC: 4.3 MMOL/L (ref 3.5–5.2)
PROT SERPL-MCNC: 6.4 G/DL (ref 6–8.5)
RBC # BLD AUTO: 4.42 10*6/MM3 (ref 3.77–5.28)
SODIUM SERPL-SCNC: 143 MMOL/L (ref 136–145)
TRIGL SERPL-MCNC: 125 MG/DL (ref 0–150)
TSH SERPL DL<=0.005 MIU/L-ACNC: 5.38 UIU/ML (ref 0.27–4.2)
VIT B12 SERPL-MCNC: 387 PG/ML (ref 211–946)
VLDLC SERPL CALC-MCNC: 22 MG/DL (ref 5–40)
WBC # BLD AUTO: 3.83 10*3/MM3 (ref 3.4–10.8)

## 2022-09-25 ENCOUNTER — PATIENT MESSAGE (OUTPATIENT)
Dept: INTERNAL MEDICINE | Facility: CLINIC | Age: 72
End: 2022-09-25

## 2022-09-26 ENCOUNTER — TRANSCRIBE ORDERS (OUTPATIENT)
Dept: ADMINISTRATIVE | Facility: HOSPITAL | Age: 72
End: 2022-09-26

## 2022-09-26 DIAGNOSIS — Z12.31 SCREENING MAMMOGRAM FOR BREAST CANCER: Primary | ICD-10-CM

## 2022-09-27 DIAGNOSIS — R53.82 CHRONIC FATIGUE, UNSPECIFIED: ICD-10-CM

## 2022-09-27 DIAGNOSIS — R79.89 ELEVATED TSH: Primary | ICD-10-CM

## 2022-09-27 NOTE — TELEPHONE ENCOUNTER
I sent a message to her regarding lab report today after she did not answer this afternoon. Please call to make sure she see the message attached to the lab report, as I need her to come into the office for additional lab test.   Thank you

## 2022-09-29 ENCOUNTER — TELEPHONE (OUTPATIENT)
Dept: INTERNAL MEDICINE | Facility: CLINIC | Age: 72
End: 2022-09-29

## 2022-09-29 NOTE — TELEPHONE ENCOUNTER
Caller: Lakeisha Gunn    Relationship: Self    Best call back number: [phone]    What was the call regarding: PLEASE ADVISE IF PATIENTS NEEDS AN OFFICE VISIT WITH LABS OR JUST LABS. NO OPENINGS WITH DR LEW IN OFFICE UNTIL 11/2    Do you require a callback: YES

## 2022-12-21 RX ORDER — ROSUVASTATIN CALCIUM 5 MG/1
TABLET, COATED ORAL
Qty: 90 TABLET | Refills: 3 | OUTPATIENT
Start: 2022-12-21

## 2023-01-16 RX ORDER — FLUOCINONIDE TOPICAL SOLUTION USP, 0.05% 0.5 MG/ML
SOLUTION TOPICAL
COMMUNITY
Start: 2022-09-20 | End: 2023-01-17

## 2023-01-17 ENCOUNTER — OFFICE VISIT (OUTPATIENT)
Dept: SURGERY | Facility: CLINIC | Age: 73
End: 2023-01-17
Payer: MEDICARE

## 2023-01-17 VITALS
DIASTOLIC BLOOD PRESSURE: 58 MMHG | SYSTOLIC BLOOD PRESSURE: 120 MMHG | WEIGHT: 126.8 LBS | HEIGHT: 65 IN | OXYGEN SATURATION: 100 % | HEART RATE: 98 BPM | BODY MASS INDEX: 21.13 KG/M2

## 2023-01-17 DIAGNOSIS — K60.2 ANAL FISSURE: Primary | ICD-10-CM

## 2023-01-17 PROCEDURE — 99204 OFFICE O/P NEW MOD 45 MIN: CPT | Performed by: PHYSICIAN ASSISTANT

## 2023-01-17 RX ORDER — MULTIPLE VITAMINS W/ MINERALS TAB 9MG-400MCG
1 TAB ORAL DAILY
COMMUNITY

## 2023-01-17 NOTE — PROGRESS NOTES
Lakeisha Gunn is a 72 y.o. female who is seen as a self-referred consult for hemorrhoid.      HPI:  States that her symptoms are still not improving after talking to 2 other doctors. 3 yrs ago started alosetron for IBS. That changed her bowel habits from chronic diarrhea to constipation--started as [place] 1 and got better over time. Her stool consistency is now between [place] 2 and 3 and has been for the last 3 years. Saw Dr. Salinas 8/30/22 and cut alosetron dose in half, which has helped and made stool consistency [place] 3.  Her BMs are uncomfortable. 1 year ago she started having blood with BMs, and this still occurs.      Reports soreness in perineum. Her problems started when her stools became hard.  Has BM every 1-2 days. Admits to some straining with BMs. Also notes the feeling of incomplete evacuation. Does not take fiber supplement but tries to eat fibrous foods in diet and hydrate well. No stool softeners, laxatives, or probiotics. no known family history of colon cancer or colon polyps.     Dr. Salinas prescribed hydrocortisone topically. Pt has used multiple tubes with only temporary relief. Sexual intercourse has gotten extremely painful. 2-3 weeks ago had horrific pain in vagina. She last saw her GYN 1 year ago, but her symptoms have worsened since then.     Past Medical History:   Diagnosis Date   • Actinic keratosis    • Anesthesia complication     SLOW TO WAKE UP.   • Asthma    • Washington's esophagus without dysplasia    • Cellulitis of head (any part, except face)    • Cholesteatoma of ear, left    • Chronic mastoiditis of left side     Otolaryngology   • Conductive hearing loss of left ear 8/1/2018   • COVID 5/25/2022    Dx 5/24/2022   • Diverticular disease    • Early satiety    • Gastroparesis    • GERD (gastroesophageal reflux disease)    • H/O Helicobacter infection    • Hearing loss    • Hemorrhoids    • Hiatal hernia    • Hyperlipidemia    • Irritable bowel syndrome with diarrhea     • Lymphocytopenia    • Lymphopenia 9/20/2021    Pt states years ago issues with a fever, infection.     • Mixed conductive and sensorineural hearing loss, unilateral, left ear with restricted hearing on the contralateral side     Unilateral mixed conductive and sensorineural hearing loss of left ear with restricted hearing on the contralateral side   • Mixed conductive and sensorineural hearing loss, unilateral, left ear, with unrestricted hearing on the contralateral side     Unilateral mixed conductive and sensorineural hearing loss of left ear with restricted hearing on the contralateral side   • Myringitis of left ear    • Paraesophageal hernia 02/26/2015    Findings via [facility] radiology. 5 cm in size   • Parotitis    • Perforated eardrum, left     MULTIPLE RECONSTRUCTIVE PROCEDURES, INCLUDING TYMPANOMASTOIDECTOMY X2   • Scoliosis    • Spondylosis    • Unilateral sensorineural hearing loss     Unilateral sensorineural hearing loss of right ear with restricted hearing on the contralateral side (Primary Dx);   • Wears contact lenses    • Wears glasses        Past Surgical History:   Procedure Laterality Date   • APPENDECTOMY     • COLONOSCOPY N/A 09/27/2017    Diverticulosis in sigmoid/descending colon - Bx: , [facility]. Dr. Ye Haywood.   • EAR MASTOIDECTOMY W/ COCHLEAR IMPLANT W/ LANDMARK Left 01/29/2019    Iraj Bautista MD   • ENDOSCOPY N/A 10/13/2020    Stomach Bx: Mild chronic inflammation. Esophagus Bx: Chronic inflammation in the gastric mucosa. Procedure: ESOPHAGOGASTRODUODENOSCOPY WITH BIOPSIES;  Surgeon: Noe Salinas MD;  Location: [facility] ENDOSCOPY;  Service: Gastroenterology;  Laterality: N/A;  PRE OP - h/O Anna'SPOST OP - ANNA'S,  FUNDAL PLICATION GASTRITIS   • ENDOSCOPY N/A 03/03/2015    Anna's esophagus, A small paraesophageal hernia, stomach exam normal, duodenum exam normal, Nissen fundoplication found, wrap appears tight. [facility]  [place]. Dr. Ye Haywood.   • ENDOSCOPY N/A 09/27/2017    Z line found at 39 cm -Bx: Entire stomach normal - Bx: H-pylori gastritis, Duodenum normal - Bx: Benign. [facility], Dr. Ye Haywood.   • ENDOSCOPY AND COLONOSCOPY N/A 12/30/2013    A 1 cm Tubular Adenoma polyp in cecum. Washington's esophagus. Duo bx: benign, Gastric bx; benign, Dr. Ye Haywood.   • ESOPHAGEAL MANOMETRY N/A 02/05/2014    Plus/minus abnormalities of the lower/upper esophageal sphincter consistent with Reflux disease, abnormal body consistent with reflus disease & previous abnormal de la garza ph study. [facility], Dr. Ye Haywood.   • EXTERNAL EAR SURGERY     • HERNIA REPAIR N/A 03/12/2015    S/P NISSEN PROCEDURE X2. Antoni Haywood surgeon.   • INNER EAR SURGERY      MULTIPLE RECONSTRUCTIVE PROCEDURES, INCLUDING TYMPANOMASTOIDECTOMY X2   • NISSEN FUNDOPLICATION N/A 03/13/2014   • NISSEN FUNDOPLICATION N/A 03/2015   • OTHER SURGICAL HISTORY N/A 06/26/2015    UPPER GI SERIES W/FLUROSCOPY. Stomach normal rugal fold thickness w/out ulceration or mass. No recurrent hernia seen. Dr. Ye Haywood.   • TONSILLECTOMY         Social History:   reports that she has never smoked. She has never been exposed to tobacco smoke. She has never used smokeless tobacco. She reports current alcohol use. Drug use questions deferred to the physician.      Marriage status:     Family History   Adopted: Yes   Problem Relation Age of Onset   • Cancer Daughter    • Vaginal cancer Daughter    • Vaginal cancer Daughter    • Cancer Daughter    • Colon cancer Neg Hx    • Colon polyps Neg Hx          Current Outpatient Medications:   •  alosetron (LOTRONEX) 0.5 MG tablet, Take 1 tablet by mouth 2 (Two) Times a Day., Disp: 180 tablet, Rfl: 3  •  esomeprazole (nexIUM) 40 MG capsule, TAKE 1 CAPSULE TWICE A DAY, Disp: 60 capsule, Rfl: 0  •  hydrocortisone 2.5 % ointment, Apply 1 application topically to the appropriate area  as directed 2 (Two) Times a Day., Disp: 30 g, Rfl: 11  •  multivitamin with minerals (Multivitamin Adult) tablet tablet, Take 1 tablet by mouth Daily., Disp: , Rfl:   •  rosuvastatin (CRESTOR) 5 MG tablet, TAKE 1 TABLET EVERY NIGHT, Disp: 90 tablet, Rfl: 1    Allergy  Patient has no known allergies.    Vitals:    01/17/23 1037   BP: 120/58   Pulse: 98   SpO2: 100%   -  Body mass index is 21.1 kg/m².    Physical Exam  No acute distress  Chaperone present  Perianal exam: anterior and posterior fissures. Tender to palpation/retratction of perianal skin.    Assessment:  1. Anal fissure        Plan:  Fiber tends to cause bloating in pt, so I recommend trying Miralax first.   I provided a prescription for diltiazem/lidocaine/baclofen compound cream for the anal fissure and provided detailed instructions  Follow-up in 6 weeks for reevaluation. Call with questions, concerns, or worsening symptoms.        Manisha Teixeira PA-C  Physician Assistant  Colorectal Surgery

## 2023-02-28 ENCOUNTER — OFFICE VISIT (OUTPATIENT)
Dept: SURGERY | Facility: CLINIC | Age: 73
End: 2023-02-28
Payer: MEDICARE

## 2023-02-28 VITALS
DIASTOLIC BLOOD PRESSURE: 68 MMHG | HEART RATE: 111 BPM | BODY MASS INDEX: 21.23 KG/M2 | WEIGHT: 127.4 LBS | HEIGHT: 65 IN | OXYGEN SATURATION: 99 % | SYSTOLIC BLOOD PRESSURE: 114 MMHG

## 2023-02-28 DIAGNOSIS — R10.2 PELVIC PAIN: Primary | ICD-10-CM

## 2023-02-28 PROCEDURE — 99213 OFFICE O/P EST LOW 20 MIN: CPT | Performed by: PHYSICIAN ASSISTANT

## 2023-02-28 PROCEDURE — 46600 DIAGNOSTIC ANOSCOPY SPX: CPT | Performed by: PHYSICIAN ASSISTANT

## 2023-02-28 NOTE — PROGRESS NOTES
"Lakeisha Gunn is a 72 y.o. female in for follow up of pelvic pain, anal fissure.    No bleeding in about 4 weeks. Still quite a bit of pain. Can't sit without pain. Walking is painful.  Pain location is from anus to gluteal crease, as well as vaginal region.  MiraLAX caused diarrhea.  The patient has increased her roughage and water intake.  She is now taking MiraLAX every 2 or 3 days.  She believes the fissure is healed.    /68 (BP Location: Left arm, Patient Position: Sitting, Cuff Size: Adult)   Pulse 111   Ht 165.1 cm (65\")   Wt 57.8 kg (127 lb 6.4 oz)   LMP  (LMP Unknown)   SpO2 99%   Breastfeeding No   BMI 21.20 kg/m²   Body mass index is 21.2 kg/m².  -  Physical Exam  No acute distress  Chaperone present  Perianal exam: external hemorrhoids normal. No fissure visualized. HARMONY: good tone, no masses. Anoscopy performed: normal internal hemorrhoids    Assessment:   1. Pelvic pain       Plan:  • Recommend follow-up with gynecology due to pain and vaginal region  • Try magnesium supplement for pelvic floor  • Call our office for follow-up after gynecology evaluation  • Consider colonoscopy/flexible sigmoidoscopy  • Patient in agreement with plan        Manisha Teixeira PA-C  Physician Assistant  Colorectal Surgery        "

## 2023-03-30 ENCOUNTER — HOSPITAL ENCOUNTER (OUTPATIENT)
Dept: MAMMOGRAPHY | Facility: HOSPITAL | Age: 73
Discharge: HOME | End: 2023-03-30
Payer: MEDICARE

## 2023-03-30 ENCOUNTER — HOSPITAL ENCOUNTER (OUTPATIENT)
Dept: BONE DENSITY | Facility: HOSPITAL | Age: 73
Discharge: HOME | End: 2023-03-30
Payer: MEDICARE

## 2023-03-30 DIAGNOSIS — Z78.0 POST-MENOPAUSAL: ICD-10-CM

## 2023-03-30 DIAGNOSIS — Z12.31 SCREENING MAMMOGRAM FOR BREAST CANCER: ICD-10-CM

## 2023-03-30 PROCEDURE — 77063 BREAST TOMOSYNTHESIS BI: CPT

## 2023-03-30 PROCEDURE — 77080 DXA BONE DENSITY AXIAL: CPT

## 2023-03-30 PROCEDURE — 77067 SCR MAMMO BI INCL CAD: CPT

## 2023-08-02 ENCOUNTER — OFFICE VISIT (OUTPATIENT)
Dept: INTERNAL MEDICINE | Facility: CLINIC | Age: 73
End: 2023-08-02
Payer: MEDICARE

## 2023-08-02 VITALS
WEIGHT: 128 LBS | SYSTOLIC BLOOD PRESSURE: 122 MMHG | BODY MASS INDEX: 21.33 KG/M2 | HEART RATE: 78 BPM | HEIGHT: 65 IN | OXYGEN SATURATION: 99 % | RESPIRATION RATE: 18 BRPM | DIASTOLIC BLOOD PRESSURE: 78 MMHG

## 2023-08-02 DIAGNOSIS — M85.852 OSTEOPENIA OF BOTH HIPS: ICD-10-CM

## 2023-08-02 DIAGNOSIS — M81.0 OSTEOPOROSIS OF LUMBAR SPINE: ICD-10-CM

## 2023-08-02 DIAGNOSIS — K57.90 DIVERTICULAR DISEASE: Chronic | ICD-10-CM

## 2023-08-02 DIAGNOSIS — E03.8 SUBCLINICAL HYPOTHYROIDISM: ICD-10-CM

## 2023-08-02 DIAGNOSIS — K22.70 BARRETT'S ESOPHAGUS WITHOUT DYSPLASIA: Chronic | ICD-10-CM

## 2023-08-02 DIAGNOSIS — Z76.89 ENCOUNTER TO ESTABLISH CARE WITH NEW DOCTOR: Primary | ICD-10-CM

## 2023-08-02 DIAGNOSIS — R53.83 OTHER FATIGUE: ICD-10-CM

## 2023-08-02 DIAGNOSIS — M85.851 OSTEOPENIA OF BOTH HIPS: ICD-10-CM

## 2023-08-02 DIAGNOSIS — K58.0 IRRITABLE BOWEL SYNDROME WITH DIARRHEA: Chronic | ICD-10-CM

## 2023-08-02 DIAGNOSIS — E78.5 HYPERLIPIDEMIA, UNSPECIFIED HYPERLIPIDEMIA TYPE: Chronic | ICD-10-CM

## 2023-09-13 ENCOUNTER — TELEPHONE (OUTPATIENT)
Dept: FAMILY MEDICINE CLINIC | Facility: CLINIC | Age: 73
End: 2023-09-13

## 2023-09-13 NOTE — TELEPHONE ENCOUNTER
Caller: Lakeisha Gunn    Relationship: Self    Best call back number: [phone]    What orders are you requesting (i.e. lab or imaging): CELIAC DISEASE LAB PANEL     In what timeframe would the patient need to come in: NOVEMBER 6 2023     Where will you receive your lab/imaging services: IN OFFICE    Additional notes:     PLEASE ADVISE    Message sent to patient via [employer]

## 2023-09-18 DIAGNOSIS — K52.9 CHRONIC DIARRHEA: Primary | ICD-10-CM

## 2023-10-30 DIAGNOSIS — K52.9 CHRONIC DIARRHEA: ICD-10-CM

## 2023-11-06 ENCOUNTER — TELEPHONE (OUTPATIENT)
Dept: GASTROENTEROLOGY | Facility: CLINIC | Age: 73
End: 2023-11-06
Payer: MEDICARE

## 2023-11-06 NOTE — TELEPHONE ENCOUNTER
Received refill request for Esomeprazole and Alosetron. Patient is overdue for a follow up appointment. Left patient a voicemail to call the office.

## 2023-11-08 DIAGNOSIS — E78.2 MIXED HYPERLIPIDEMIA: Primary | Chronic | ICD-10-CM

## 2023-11-08 RX ORDER — ROSUVASTATIN CALCIUM 10 MG/1
10 TABLET, COATED ORAL NIGHTLY
Qty: 90 TABLET | Refills: 3 | Status: SHIPPED | OUTPATIENT
Start: 2023-11-08

## 2024-01-02 ENCOUNTER — TELEPHONE (OUTPATIENT)
Dept: GASTROENTEROLOGY | Facility: CLINIC | Age: 74
End: 2024-01-02
Payer: MEDICARE

## 2024-01-02 ENCOUNTER — OFFICE VISIT (OUTPATIENT)
Dept: GASTROENTEROLOGY | Facility: CLINIC | Age: 74
End: 2024-01-02
Payer: MEDICARE

## 2024-01-02 VITALS
DIASTOLIC BLOOD PRESSURE: 84 MMHG | BODY MASS INDEX: 21.86 KG/M2 | SYSTOLIC BLOOD PRESSURE: 138 MMHG | HEIGHT: 65 IN | WEIGHT: 131.2 LBS

## 2024-01-02 DIAGNOSIS — K21.00 GASTROESOPHAGEAL REFLUX DISEASE WITH ESOPHAGITIS WITHOUT HEMORRHAGE: ICD-10-CM

## 2024-01-02 DIAGNOSIS — K58.0 IRRITABLE BOWEL SYNDROME WITH DIARRHEA: Primary | Chronic | ICD-10-CM

## 2024-01-02 DIAGNOSIS — Z86.010 PERSONAL HISTORY OF COLONIC POLYPS: ICD-10-CM

## 2024-01-02 PROBLEM — K22.70 BARRETT'S ESOPHAGUS WITHOUT DYSPLASIA: Chronic | Status: RESOLVED | Noted: 2019-04-16 | Resolved: 2024-01-02

## 2024-01-02 PROCEDURE — 99214 OFFICE O/P EST MOD 30 MIN: CPT | Performed by: INTERNAL MEDICINE

## 2024-01-02 RX ORDER — ESTRADIOL 10 UG/1
1 INSERT VAGINAL 2 TIMES WEEKLY
COMMUNITY
Start: 2023-07-01

## 2024-01-02 RX ORDER — IBUPROFEN 200 MG
TABLET ORAL EVERY 8 HOURS PRN
COMMUNITY

## 2024-01-02 NOTE — PROGRESS NOTES
PATIENT INFORMATION  Lakeisha Gunn       - 1950    CHIEF COMPLAINT  Chief Complaint   Patient presents with    Washington's esophagus    Irritable Bowel Syndrome       HISTORY OF PRESENT ILLNESS  Here for IBS-D and actually got to reduce hr alosetron to daily and is avoiding the constipaiton and hemorrhoid issuse and when it arises  she does well.            REVIEWED PERTINENT RESULTS/ LABS  Lab Results   Component Value Date    CASEREPORT  10/13/2020     Surgical Pathology Report                         Case: [id number]                                  Authorizing Provider:  Noe Salinas        Collected:           10/13/2020 02:39 PM                                 MD Larry                                                                   Ordering Location:     [facility]  Received:            10/13/2020 03:25 PM                                 ENDO SUITES                                                                  Pathologist:           Samuel Christie MD                                                         Specimens:   1) - Stomach, GASTRIC BIOPSIES                                                                      2) - Esophagus, Distal, DISTAL ESOPHAGUS BIOPSIES                                          FINALDX  10/13/2020     1. Stomach, Biopsy: Benign gastric mucosa with   A. Intestinal metaplasia with no dysplasia.   B. Mild chronic inflammation.   C. No Helicobacter pylori.    2. Esophagus, Distal, Biopsy: Benign squamous and gastric mucosa with    A. Chronic inflammation in the gastric mucosa.   B. No intestinal metaplasia.   C. No Helicobacter pylori.    elvin/jse        Lab Results   Component Value Date    HGB 14.0 2023    MCV 96.3 2023     2023    ALT 20 2023    AST 22 2023    TRIG 119 2023      No results found.    REVIEW OF SYSTEMS  Review of Systems   Constitutional:  Negative for activity change, chills, fever and  unexpected weight change.   HENT:  Negative for congestion.    Eyes:  Negative for visual disturbance.   Respiratory:  Negative for shortness of breath.    Cardiovascular:  Negative for chest pain and palpitations.   Gastrointestinal:  Positive for anal bleeding (hemorrhoid). Negative for abdominal pain and blood in stool.   Endocrine: Negative for cold intolerance and heat intolerance.   Genitourinary:  Negative for hematuria.   Musculoskeletal:  Negative for gait problem.   Skin:  Negative for color change.   Allergic/Immunologic: Negative for immunocompromised state.   Neurological:  Negative for weakness and light-headedness.   Hematological:  Negative for adenopathy.   Psychiatric/Behavioral:  Negative for sleep disturbance. The patient is not nervous/anxious.          ACTIVE PROBLEMS  Patient Active Problem List    Diagnosis     COVID [U07.1]     Lymphopenia [D72.810]     Irritable bowel syndrome with diarrhea [K58.0]     Conductive hearing loss of left ear [H90.12]     Diverticular disease [K57.90]     Asthma [J45.909]     Hyperlipidemia [E78.5]          PAST MEDICAL HISTORY  Past Medical History:   Diagnosis Date    Actinic keratosis     Anesthesia complication     SLOW TO WAKE UP.    Asthma     Washington's esophagus without dysplasia     Cellulitis of head (any part, except face)     Cholesteatoma of ear, left     Chronic mastoiditis of left side     Otolaryngology    Conductive hearing loss of left ear 8/1/2018    COVID 5/25/2022    Dx 5/24/2022    Diverticular disease     Early satiety     Gastroparesis     GERD (gastroesophageal reflux disease)     H/O Helicobacter infection     Hearing loss     Hemorrhoids     Hiatal hernia     Hyperlipidemia     Irritable bowel syndrome with diarrhea     Lymphocytopenia     Lymphopenia 9/20/2021    Pt states years ago issues with a fever, infection.      Mixed conductive and sensorineural hearing loss, unilateral, left ear with restricted hearing on the contralateral  side     Unilateral mixed conductive and sensorineural hearing loss of left ear with restricted hearing on the contralateral side    Mixed conductive and sensorineural hearing loss, unilateral, left ear, with unrestricted hearing on the contralateral side     Unilateral mixed conductive and sensorineural hearing loss of left ear with restricted hearing on the contralateral side    Myringitis of left ear     Paraesophageal hernia 02/26/2015    Findings via [facility] radiology. 5 cm in size    Parotitis     Perforated eardrum, left     MULTIPLE RECONSTRUCTIVE PROCEDURES, INCLUDING TYMPANOMASTOIDECTOMY X2    Scoliosis     Spondylosis     Unilateral sensorineural hearing loss     Unilateral sensorineural hearing loss of right ear with restricted hearing on the contralateral side (Primary Dx);    Wears contact lenses     Wears glasses          SURGICAL HISTORY  Past Surgical History:   Procedure Laterality Date    APPENDECTOMY      COLONOSCOPY N/A 09/27/2017    Diverticulosis in sigmoid/descending colon - Bx: , [facility]. Dr. Ye Haywood.    EAR MASTOIDECTOMY W/ COCHLEAR IMPLANT W/ LANDMARK Left 01/29/2019    Iraj Bautista MD    ENDOSCOPY N/A 10/13/2020    Stomach Bx: Mild chronic inflammation. Esophagus Bx: Chronic inflammation in the gastric mucosa. Procedure: ESOPHAGOGASTRODUODENOSCOPY WITH BIOPSIES;  Surgeon: Noe Salinas MD;  Location: [facility] ENDOSCOPY;  Service: Gastroenterology;  Laterality: N/A;  PRE OP - h/O Anna'SPOST OP - ANNA'S,  FUNDAL PLICATION GASTRITIS    ENDOSCOPY N/A 03/03/2015    Anna's esophagus, A small paraesophageal hernia, stomach exam normal, duodenum exam normal, Nissen fundoplication found, wrap appears tight. [facility]. Dr. Ye Haywood.    ENDOSCOPY N/A 09/27/2017    Z line found at 39 cm -Bx: Entire stomach normal - Bx: H-pylori gastritis, Duodenum normal - Bx: Benign. [facility], Dr. Ye Haywood.     ENDOSCOPY AND COLONOSCOPY N/A 12/30/2013    A 1 cm Tubular Adenoma polyp in cecum. Washington's esophagus. Duo bx: benign, Gastric bx; benign, Dr. Ye Haywood.    ESOPHAGEAL MANOMETRY N/A 02/05/2014    Plus/minus abnormalities of the lower/upper esophageal sphincter consistent with Reflux disease, abnormal body consistent with reflus disease & previous abnormal de la garza ph study. [facility], Dr. Ye Haywood.    EXTERNAL EAR SURGERY      HERNIA REPAIR N/A 03/12/2015    S/P NISSEN PROCEDURE X2. Antoni Haywood surgeon.    INNER EAR SURGERY      MULTIPLE RECONSTRUCTIVE PROCEDURES, INCLUDING TYMPANOMASTOIDECTOMY X2    NISSEN FUNDOPLICATION N/A 03/13/2014    NISSEN FUNDOPLICATION N/A 03/2015    OTHER SURGICAL HISTORY N/A 06/26/2015    UPPER GI SERIES W/FLUROSCOPY. Stomach normal rugal fold thickness w/out ulceration or mass. No recurrent hernia seen. Dr. Ye Haywood.    TONSILLECTOMY           FAMILY HISTORY  Family History   Adopted: Yes   Problem Relation Age of Onset    Cancer Daughter     Vaginal cancer Daughter     Vaginal cancer Daughter     Cancer Daughter     Colon cancer Neg Hx     Colon polyps Neg Hx          SOCIAL HISTORY  Social History     Occupational History    Not on file   Tobacco Use    Smoking status: Never     Passive exposure: Never    Smokeless tobacco: Never   Vaping Use    Vaping Use: Never used   Substance and Sexual Activity    Alcohol use: Yes    Drug use: Defer    Sexual activity: Yes     Partners: Male     Birth control/protection: Post-menopausal     Comment:  TO Ruel Gunn.         CURRENT MEDICATIONS    Current Outpatient Medications:     alosetron (LOTRONEX) 0.5 MG tablet, Take 1 tablet by mouth 2 (Two) Times a Day. (Patient taking differently: Take 1 tablet by mouth Daily.), Disp: 180 tablet, Rfl: 3    esomeprazole (nexIUM) 40 MG capsule, Take 1 capsule by mouth 2 (Two) Times a Day. (Patient taking differently: Take 1 capsule by mouth Daily.), Disp: 60  "capsule, Rfl: 0    estradiol (VAGIFEM) 10 MCG tablet vaginal tablet, Insert 1 tablet into the vagina 2 (Two) Times a Week., Disp: , Rfl:     ibuprofen (ADVIL,MOTRIN) 200 MG tablet, Take  by mouth Every 8 (Eight) Hours As Needed., Disp: , Rfl:     multivitamin with minerals tablet tablet, Take 1 tablet by mouth Daily., Disp: , Rfl:     rosuvastatin (Crestor) 10 MG tablet, Take 1 tablet by mouth Every Night., Disp: 90 tablet, Rfl: 3    ALLERGIES  Patient has no known allergies.    VITALS  Vitals:    01/02/24 0841   BP: 138/84   BP Location: Left arm   Patient Position: Sitting   Cuff Size: Adult   Weight: 59.5 kg (131 lb 3.2 oz)   Height: 165.1 cm (65\")       PHYSICAL EXAM  Debilities/Disabilities Identified: None  Emotional Behavior: Appropriate  Wt Readings from Last 3 Encounters:   01/02/24 59.5 kg (131 lb 3.2 oz)   08/02/23 58.1 kg (128 lb)   02/28/23 57.8 kg (127 lb 6.4 oz)     Ht Readings from Last 1 Encounters:   01/02/24 165.1 cm (65\")     Body mass index is 21.83 kg/m².  Physical Exam  Constitutional:       Appearance: She is well-developed. She is not diaphoretic.   Eyes:      General: No scleral icterus.     Conjunctiva/sclera: Conjunctivae normal.      Pupils: Pupils are equal, round, and reactive to light.   Neck:      Thyroid: No thyromegaly.   Cardiovascular:      Rate and Rhythm: Normal rate and regular rhythm.      Heart sounds: Normal heart sounds. No murmur heard.     No gallop.   Pulmonary:      Effort: Pulmonary effort is normal.      Breath sounds: Normal breath sounds. No wheezing or rales.   Abdominal:      General: Bowel sounds are normal. There is no distension or abdominal bruit.      Palpations: Abdomen is soft. There is no shifting dullness, fluid wave or mass.      Tenderness: There is no abdominal tenderness. There is no guarding. Negative signs include Rust's sign.      Hernia: There is no hernia in the ventral area.   Musculoskeletal:         General: Normal range of motion.      " Cervical back: Normal range of motion and neck supple.   Lymphadenopathy:      Cervical: No cervical adenopathy.   Skin:     General: Skin is warm and dry.      Findings: No erythema or rash.   Neurological:      Mental Status: She is alert and oriented to person, place, and time.         CLINICAL DATA REVIEWED   reviewed previous lab results and integrated with today's visit, reviewed notes from other physicians and/or last GI encounter, reviewed previous endoscopy results and available photos, reviewed surgical pathology results from previous biopsies    ASSESSMENT  Diagnoses and all orders for this visit:    Irritable bowel syndrome with diarrhea    Gastroesophageal reflux disease with esophagitis without hemorrhage    Personal history of colonic polyps  -     Case Request; Standing  -     Case Request    Other orders  -     estradiol (VAGIFEM) 10 MCG tablet vaginal tablet; Insert 1 tablet into the vagina 2 (Two) Times a Week.  -     ibuprofen (ADVIL,MOTRIN) 200 MG tablet; Take  by mouth Every 8 (Eight) Hours As Needed.  -     Obtain informed consent; Standing  -     Follow Anesthesia Guidelines / Protocol; Future  -     Verify bowel prep was successful; Standing  -     Give tap water enema if bowel prep was insufficient; Standing          PLAN    Return if symptoms worsen or fail to improve.    I have discussed the above plan with the patient.  They verbalize understanding and are in agreement with the plan.  They have been advised to contact the office for any questions, concerns, or changes related to their health.

## 2024-01-16 ENCOUNTER — TELEPHONE (OUTPATIENT)
Dept: GASTROENTEROLOGY | Facility: CLINIC | Age: 74
End: 2024-01-16
Payer: MEDICARE

## 2024-01-16 NOTE — TELEPHONE ENCOUNTER
Calling about refill on:    esomeprazole (nexIUM) 40 MG capsule - I am completely out.  alosetron (LOTRONEX) 0.5 MG tablet    Please see to [facility].    Last office visit with Dr. Salinas on 01/02/2024.

## 2024-01-17 DIAGNOSIS — K58.0 IRRITABLE BOWEL SYNDROME WITH DIARRHEA: ICD-10-CM

## 2024-01-17 DIAGNOSIS — K22.70 BARRETT'S ESOPHAGUS WITHOUT DYSPLASIA: ICD-10-CM

## 2024-01-17 RX ORDER — ESOMEPRAZOLE MAGNESIUM 40 MG/1
40 CAPSULE, DELAYED RELEASE ORAL DAILY
Qty: 90 CAPSULE | Refills: 3 | Status: SHIPPED | OUTPATIENT
Start: 2024-01-17

## 2024-01-17 RX ORDER — ALOSETRON HYDROCHLORIDE 0.5 MG/1
0.5 TABLET ORAL DAILY
Qty: 90 TABLET | Refills: 3 | Status: SHIPPED | OUTPATIENT
Start: 2024-01-17

## 2024-01-19 ENCOUNTER — TELEPHONE (OUTPATIENT)
Dept: GASTROENTEROLOGY | Facility: CLINIC | Age: 74
End: 2024-01-19
Payer: MEDICARE

## 2024-03-11 PROBLEM — Z86.010 PERSONAL HISTORY OF COLONIC POLYPS: Status: ACTIVE | Noted: 2024-01-02

## 2024-03-11 PROBLEM — Z86.0100 PERSONAL HISTORY OF COLONIC POLYPS: Status: ACTIVE | Noted: 2024-01-02

## 2024-04-16 ENCOUNTER — TELEPHONE (OUTPATIENT)
Dept: GASTROENTEROLOGY | Facility: CLINIC | Age: 74
End: 2024-04-16
Payer: MEDICARE

## 2024-04-16 NOTE — TELEPHONE ENCOUNTER
Patient called stating completely out of Esomeprazole 40 mg.  [facility] gave her ER dose for now.  She asking why only 3 refills were given?  Please review and reach out to patient.    Please send to [facility].

## 2024-04-16 NOTE — TELEPHONE ENCOUNTER
Left detailed message letting patient know that 90 day supply plus 3 refills was sent into [facility] on 1/17/2024. I advised to call [facility].

## 2024-04-19 ENCOUNTER — ANESTHESIA EVENT (OUTPATIENT)
Dept: SURGERY | Facility: SURGERY CENTER | Age: 74
End: 2024-04-19
Payer: MEDICARE

## 2024-04-19 ENCOUNTER — ANESTHESIA (OUTPATIENT)
Dept: SURGERY | Facility: SURGERY CENTER | Age: 74
End: 2024-04-19
Payer: MEDICARE

## 2024-04-19 ENCOUNTER — HOSPITAL ENCOUNTER (OUTPATIENT)
Facility: SURGERY CENTER | Age: 74
Setting detail: HOSPITAL OUTPATIENT SURGERY
Discharge: HOME | End: 2024-04-19
Attending: INTERNAL MEDICINE | Admitting: INTERNAL MEDICINE
Payer: MEDICARE

## 2024-04-19 VITALS
DIASTOLIC BLOOD PRESSURE: 76 MMHG | HEIGHT: 65 IN | TEMPERATURE: 97.7 F | HEART RATE: 78 BPM | BODY MASS INDEX: 21.36 KG/M2 | WEIGHT: 128.2 LBS | SYSTOLIC BLOOD PRESSURE: 129 MMHG | RESPIRATION RATE: 16 BRPM | OXYGEN SATURATION: 98 %

## 2024-04-19 DIAGNOSIS — Z86.010 PERSONAL HISTORY OF COLONIC POLYPS: ICD-10-CM

## 2024-04-19 PROCEDURE — 25010000002 PROPOFOL 10 MG/ML EMULSION: Performed by: ANESTHESIOLOGY

## 2024-04-19 PROCEDURE — 25010000002 LIDOCAINE 1 % SOLUTION: Performed by: ANESTHESIOLOGY

## 2024-04-19 PROCEDURE — 45380 COLONOSCOPY AND BIOPSY: CPT | Performed by: INTERNAL MEDICINE

## 2024-04-19 PROCEDURE — 25010000002 PROPOFOL 1000 MG/100ML EMULSION: Performed by: ANESTHESIOLOGY

## 2024-04-19 PROCEDURE — 88305 TISSUE EXAM BY PATHOLOGIST: CPT | Performed by: INTERNAL MEDICINE

## 2024-04-19 PROCEDURE — 45385 COLONOSCOPY W/LESION REMOVAL: CPT | Performed by: INTERNAL MEDICINE

## 2024-04-19 PROCEDURE — 25810000003 LACTATED RINGERS PER 1000 ML: Performed by: INTERNAL MEDICINE

## 2024-04-19 RX ORDER — LIDOCAINE HYDROCHLORIDE 10 MG/ML
INJECTION, SOLUTION INFILTRATION; PERINEURAL AS NEEDED
Status: DISCONTINUED | OUTPATIENT
Start: 2024-04-19 | End: 2024-04-19 | Stop reason: SURG

## 2024-04-19 RX ORDER — SODIUM CHLORIDE 0.9 % (FLUSH) 0.9 %
10 SYRINGE (ML) INJECTION AS NEEDED
Status: DISCONTINUED | OUTPATIENT
Start: 2024-04-19 | End: 2024-04-19 | Stop reason: HOSPADM

## 2024-04-19 RX ORDER — LIDOCAINE HYDROCHLORIDE 10 MG/ML
0.5 INJECTION, SOLUTION INFILTRATION; PERINEURAL ONCE AS NEEDED
Status: COMPLETED | OUTPATIENT
Start: 2024-04-19 | End: 2024-04-19

## 2024-04-19 RX ORDER — SODIUM CHLORIDE, SODIUM LACTATE, POTASSIUM CHLORIDE, CALCIUM CHLORIDE 600; 310; 30; 20 MG/100ML; MG/100ML; MG/100ML; MG/100ML
1000 INJECTION, SOLUTION INTRAVENOUS CONTINUOUS
Status: DISCONTINUED | OUTPATIENT
Start: 2024-04-19 | End: 2024-04-19 | Stop reason: HOSPADM

## 2024-04-19 RX ORDER — PROPOFOL 10 MG/ML
INJECTION, EMULSION INTRAVENOUS AS NEEDED
Status: DISCONTINUED | OUTPATIENT
Start: 2024-04-19 | End: 2024-04-19 | Stop reason: SURG

## 2024-04-19 RX ADMIN — PROPOFOL 70 MG: 10 INJECTION, EMULSION INTRAVENOUS at 08:39

## 2024-04-19 RX ADMIN — LIDOCAINE HYDROCHLORIDE 3 ML: 10 INJECTION, SOLUTION INFILTRATION; PERINEURAL at 08:35

## 2024-04-19 RX ADMIN — LIDOCAINE HYDROCHLORIDE 0.5 ML: 10 INJECTION, SOLUTION EPIDURAL; INFILTRATION; INTRACAUDAL; PERINEURAL at 08:07

## 2024-04-19 RX ADMIN — PROPOFOL 140 MCG/KG/MIN: 10 INJECTION, EMULSION INTRAVENOUS at 08:40

## 2024-04-19 RX ADMIN — SODIUM CHLORIDE, POTASSIUM CHLORIDE, SODIUM LACTATE AND CALCIUM CHLORIDE 1000 ML: 600; 310; 30; 20 INJECTION, SOLUTION INTRAVENOUS at 08:07

## 2024-04-19 NOTE — ANESTHESIA POSTPROCEDURE EVALUATION
"Patient: Lakeisha Gunn    Procedure Summary       Date: 04/19/24 Room / Location: [facility]    Anesthesia Start: 0832 Anesthesia Stop: 0904    Procedure: COLONOSCOPY to Cecum with Polypectomy Diagnosis:       Personal history of colonic polyps      Colon polyp      (Personal history of colonic polyps [Z86.010])    Surgeons: Noe Salinas MD Provider: Jose Ramirez MD    Anesthesia Type: MAC ASA Status: 2            Anesthesia Type: MAC    Vitals  Vitals Value Taken Time   /71 04/19/24 0921   Temp 36.5 °C (97.7 °F) 04/19/24 0907   Pulse 77 04/19/24 0921   Resp 16 04/19/24 0921   SpO2 97 % 04/19/24 0921           Post Anesthesia Care and Evaluation    Level of consciousness: awake and alert  Pain management: adequate    Airway patency: patent  Anesthetic complications: No anesthetic complications  PONV Status: none  Cardiovascular status: acceptable  Respiratory status: acceptable  Hydration status: acceptable    Comments: /71   Pulse 77   Temp 36.5 °C (97.7 °F) (Temporal)   Resp 16   Ht 165.1 cm (65\")   Wt 58.2 kg (128 lb 3.2 oz)   LMP  (LMP Unknown)   SpO2 97%   BMI 21.33 kg/m²       "

## 2024-04-19 NOTE — H&P
Patient Care Team:  Ruel Yañez MD as PCP - General (Family Medicine)  Rita, Noe Juarez MD as Consulting Physician (Gastroenterology)  Manisha Teixeira PA-C as Physician Assistant (Physician Assistant)    CHIEF COMPLAINT: Personal hx colon polyps    HISTORY OF PRESENT ILLNESS:  Last exam was 2017    Past Medical History:   Diagnosis Date    Actinic keratosis     Anesthesia complication     SLOW TO WAKE UP.    Asthma     Washington's esophagus without dysplasia     Cellulitis of head (any part, except face)     Cholesteatoma of ear, left     Chronic mastoiditis of left side     Otolaryngology    Conductive hearing loss of left ear 8/1/2018    COVID 5/25/2022    Dx 5/24/2022    Diverticular disease     Early satiety     Gastroparesis     GERD (gastroesophageal reflux disease)     H/O Helicobacter infection     Hearing loss     Hemorrhoids     Hiatal hernia     Hyperlipidemia     Irritable bowel syndrome with diarrhea     Lymphocytopenia     Lymphopenia 9/20/2021    Pt states years ago issues with a fever, infection.      Mixed conductive and sensorineural hearing loss, unilateral, left ear with restricted hearing on the contralateral side     Unilateral mixed conductive and sensorineural hearing loss of left ear with restricted hearing on the contralateral side    Mixed conductive and sensorineural hearing loss, unilateral, left ear, with unrestricted hearing on the contralateral side     Unilateral mixed conductive and sensorineural hearing loss of left ear with restricted hearing on the contralateral side    Myringitis of left ear     Paraesophageal hernia 02/26/2015    Findings via [facility] radiology. 5 cm in size    Parotitis     Perforated eardrum, left     MULTIPLE RECONSTRUCTIVE PROCEDURES, INCLUDING TYMPANOMASTOIDECTOMY X2    Scoliosis     Spondylosis     Unilateral sensorineural hearing loss     Unilateral sensorineural hearing loss of right ear with restricted hearing on the contralateral side  (Primary Dx);    Wears contact lenses     Wears glasses      Past Surgical History:   Procedure Laterality Date    APPENDECTOMY      COLONOSCOPY N/A 09/27/2017    Diverticulosis in sigmoid/descending colon - Bx: , [facility]. Dr. Ye Haywood.    EAR MASTOIDECTOMY W/ COCHLEAR IMPLANT W/ LANDMARK Left 01/29/2019    Iraj Bautista MD    ENDOSCOPY N/A 10/13/2020    Stomach Bx: Mild chronic inflammation. Esophagus Bx: Chronic inflammation in the gastric mucosa. Procedure: ESOPHAGOGASTRODUODENOSCOPY WITH BIOPSIES;  Surgeon: Noe Salinas MD;  Location: [facility] ENDOSCOPY;  Service: Gastroenterology;  Laterality: N/A;  PRE OP - h/O Anna'SPOST OP - ANNA'S,  FUNDAL PLICATION GASTRITIS    ENDOSCOPY N/A 03/03/2015    Anna's esophagus, A small paraesophageal hernia, stomach exam normal, duodenum exam normal, Nissen fundoplication found, wrap appears tight. [facility]. Dr. Ye Haywood.    ENDOSCOPY N/A 09/27/2017    Z line found at 39 cm -Bx: Entire stomach normal - Bx: H-pylori gastritis, Duodenum normal - Bx: Benign. [facility], Dr. Ye Haywood.    ENDOSCOPY AND COLONOSCOPY N/A 12/30/2013    A 1 cm Tubular Adenoma polyp in cecum. Anna's esophagus. Duo bx: benign, Gastric bx; benign, Dr. Ye Haywood.    ESOPHAGEAL MANOMETRY N/A 02/05/2014    Plus/minus abnormalities of the lower/upper esophageal sphincter consistent with Reflux disease, abnormal body consistent with reflus disease & previous [facility]    EXTERNAL EAR SURGERY      HERNIA REPAIR N/A 03/12/2015    S/P NISSEN PROCEDURE X2. Antoni Haywood surgeon.    INNER EAR SURGERY      MULTIPLE RECONSTRUCTIVE PROCEDURES, INCLUDING TYMPANOMASTOIDECTOMY X2    NISSEN FUNDOPLICATION N/A 03/13/2014    NISSEN FUNDOPLICATION N/A 03/2015    OTHER SURGICAL HISTORY N/A 06/26/2015    UPPER GI SERIES W/FLUROSCOPY. Stomach normal rugal  "fold thickness w/out ulceration or mass. No recurrent hernia seen. Dr. Ye Haywood.    TONSILLECTOMY       Family History   Adopted: Yes   Problem Relation Age of Onset    Cancer Daughter     Vaginal cancer Daughter     Vaginal cancer Daughter     Cancer Daughter     Colon cancer Neg Hx     Colon polyps Neg Hx      Social History     Tobacco Use    Smoking status: Never     Passive exposure: Never    Smokeless tobacco: Never   Vaping Use    Vaping status: Never Used   Substance Use Topics    Alcohol use: Yes    Drug use: Defer     Medications Prior to Admission   Medication Sig Dispense Refill Last Dose    alosetron (LOTRONEX) 0.5 MG tablet Take 1 tablet by mouth Daily. 90 tablet 3 4/18/2024    esomeprazole (nexIUM) 40 MG capsule Take 1 capsule by mouth Daily. 90 capsule 3 4/18/2024    estradiol (VAGIFEM) 10 MCG tablet vaginal tablet Insert 1 tablet into the vagina 2 (Two) Times a Week.   4/14/2024    ibuprofen (ADVIL,MOTRIN) 200 MG tablet Take  by mouth Every 8 (Eight) Hours As Needed.   Past Week    multivitamin with minerals tablet tablet Take 1 tablet by mouth Daily.   4/18/2024    rosuvastatin (Crestor) 10 MG tablet Take 1 tablet by mouth Every Night. 90 tablet 3 4/18/2024     Allergies:  Patient has no known allergies.    REVIEW OF SYSTEMS:  Please see the above history of present illness for pertinent positives and negatives.  The remainder of the patient's systems have been reviewed and are negative.     Vital Signs  Temp:  [97.4 °F (36.3 °C)] 97.4 °F (36.3 °C)  Heart Rate:  [84] 84  Resp:  [16] 16  BP: (145)/(84) 145/84    Flowsheet Rows      Flowsheet Row First Filed Value   Admission Height 165.1 cm (65\") Documented at 04/18/2024 1025   Admission Weight 59.4 kg (131 lb) Documented at 04/18/2024 1025             Physical Exam:  Physical Exam   Constitutional: Patient appears well-developed and well-nourished and in no acute distress   HEENT:   Head: Normocephalic and atraumatic.   Eyes:  Pupils are " equal, round, and reactive to light. EOM are intact. Sclerae are anicteric and non-injected.  Mouth and Throat: Patient has moist mucous membranes. Oropharynx is clear of any erythema or exudate.     Neck: Neck supple. No JVD present. No thyromegaly present. No lymphadenopathy present.  Cardiovascular: Regular rate, regular rhythm, S1 normal and S2 normal.  Exam reveals no gallop and no friction rub.  No murmur heard.  Pulmonary/Chest: Lungs are clear to auscultation bilaterally. No respiratory distress. No wheezes. No rhonchi. No rales.   Abdominal: Soft. Bowel sounds are normal. No distension and no mass. There is no hepatosplenomegaly. There is no tenderness.   Musculoskeletal: Normal Muscle tone  Extremities: No edema. Pulses are palpable in all 4 extremities.  Neurological: Patient is alert and oriented to person, place, and time. Cranial nerves II-XII are grossly intact with no focal deficits.  Skin: Skin is warm. No rash noted. Nails show no clubbing.  No cyanosis or erythema.    Debilities/Disabilities Identified: None  Emotional Behavior: Appropriate     Results Review:   I reviewed the patient's new clinical results.    Lab Results (most recent)       None            Imaging Results (Most Recent)       None          reviewed    ECG/EMG Results (most recent)       None          reviewed    Assessment & Plan   Personal hx colon polyps/  colonoscopy      I discussed the patient's findings and my recommendations with patient.     Noe Salinas MD  04/19/24  09:05 EDT    Time: 10 min prior to procedure.

## 2024-04-19 NOTE — BRIEF OP NOTE
COLONOSCOPY  Progress Note    Lakeisha Gunn  4/19/2024    Pre-op Diagnosis:   Personal history of colonic polyps [Z86.010]       Post-Op Diagnosis Codes:     * Personal history of colonic polyps [Z86.010]     * Colon polyp [K63.5]    Procedure/CPT® Codes:        Procedure(s):  COLONOSCOPY to Cecum with Polypectomy              Surgeon(s):  Noe Salinas MD    Anesthesia: Monitored Anesthesia Care    Staff:   Endo Technician: Veronica Messer RN  Endo Nurse: Coco Haywood RN         Estimated Blood Loss: none    Urine Voided: * No values recorded between 4/19/2024  8:31 AM and 4/19/2024  9:02 AM *    Specimens:                Specimens       ID Source Type Tests Collected By Collected At Frozen?    A Large Intestine, Sigmoid Colon Tissue TISSUE PATHOLOGY EXAM   Noe Salinas MD 4/19/24 0849 No    Description: Sigmoid Polyp    This specimen was not marked as sent.    B Large Intestine, Transverse Colon Tissue TISSUE PATHOLOGY EXAM   Noe Salinas MD 4/19/24 0856 No    Description: Transverse Polyp x3    This specimen was not marked as sent.                  Drains: * No LDAs found *    Findings: Colon to Cecum good Prep  Polyps-4-Cold snare x 1, Biopsy        Complications: None          Noe Salinas MD     Date: 4/19/2024  Time: 09:06 EDT

## 2024-04-19 NOTE — ANESTHESIA PREPROCEDURE EVALUATION
Anesthesia Evaluation     Patient summary reviewed and Nursing notes reviewed   no history of anesthetic complications:   NPO Solid Status: > 8 hours  NPO Liquid Status: > 8 hours           Airway   Mallampati: II  TM distance: >3 FB  Neck ROM: full  No difficulty expected  Dental - normal exam     Pulmonary    (+) asthma,  (-) sleep apnea, rhonchi, decreased breath sounds, wheezes, not a smoker  Cardiovascular   Exercise tolerance: good (4-7 METS)    Rhythm: regular  Rate: normal    (+) hyperlipidemia  (-) hypertension, CAD, dysrhythmias, angina, MAS, murmur      Neuro/Psych  (-) seizures, CVA  GI/Hepatic/Renal/Endo    (+) hiatal hernia, GERD  (-) liver disease, no renal disease, diabetes, no thyroid disorder    Musculoskeletal     Abdominal     Abdomen: soft.   Substance History      OB/GYN          Other   arthritis,                 Anesthesia Plan    ASA 2     MAC     intravenous induction     Anesthetic plan, risks, benefits, and alternatives have been provided, discussed and informed consent has been obtained with: patient.    CODE STATUS:

## 2024-04-22 LAB
LAB AP CASE REPORT: NORMAL
PATH REPORT.FINAL DX SPEC: NORMAL
PATH REPORT.GROSS SPEC: NORMAL

## 2025-01-13 DIAGNOSIS — K22.70 BARRETT'S ESOPHAGUS WITHOUT DYSPLASIA: ICD-10-CM

## 2025-01-13 RX ORDER — ESOMEPRAZOLE MAGNESIUM 40 MG/1
CAPSULE, DELAYED RELEASE ORAL
Qty: 90 CAPSULE | Refills: 3 | OUTPATIENT
Start: 2025-01-13

## 2025-03-06 DIAGNOSIS — K22.70 BARRETT'S ESOPHAGUS WITHOUT DYSPLASIA: ICD-10-CM

## 2025-03-06 RX ORDER — ESOMEPRAZOLE MAGNESIUM 40 MG/1
40 CAPSULE, DELAYED RELEASE ORAL DAILY
Qty: 30 CAPSULE | Refills: 0 | Status: SHIPPED | OUTPATIENT
Start: 2025-03-06

## 2025-03-14 ENCOUNTER — OFFICE VISIT (OUTPATIENT)
Dept: INTERNAL MEDICINE | Facility: CLINIC | Age: 75
End: 2025-03-14
Payer: MEDICARE

## 2025-03-14 VITALS
SYSTOLIC BLOOD PRESSURE: 132 MMHG | HEART RATE: 76 BPM | DIASTOLIC BLOOD PRESSURE: 74 MMHG | RESPIRATION RATE: 18 BRPM | OXYGEN SATURATION: 98 % | HEIGHT: 65 IN | BODY MASS INDEX: 22.39 KG/M2 | WEIGHT: 134.4 LBS

## 2025-03-14 DIAGNOSIS — D72.810 LYMPHOPENIA: ICD-10-CM

## 2025-03-14 DIAGNOSIS — E55.9 VITAMIN D DEFICIENCY: ICD-10-CM

## 2025-03-14 DIAGNOSIS — E78.2 MIXED HYPERLIPIDEMIA: Primary | Chronic | ICD-10-CM

## 2025-03-14 PROCEDURE — 1126F AMNT PAIN NOTED NONE PRSNT: CPT | Performed by: STUDENT IN AN ORGANIZED HEALTH CARE EDUCATION/TRAINING PROGRAM

## 2025-03-14 PROCEDURE — 99213 OFFICE O/P EST LOW 20 MIN: CPT | Performed by: STUDENT IN AN ORGANIZED HEALTH CARE EDUCATION/TRAINING PROGRAM

## 2025-03-14 NOTE — PROGRESS NOTES
"Chief Complaint  [facility] and Hyperlipidemia    Subjective        Lakeisha Gunn presents to [facility] PRIMARY CARE  History of Present Illness  75-year-old female with chronic medical conditions of hyperlipidemia, left ear hearing loss, IBS, Washington's esophagus who presents to [facility].    She is active and feeling well and has no concerns at this time.  Regarding Washington's esophagus and IBS: She is on Lotronex and Nexium daily.  Follows with GI.  Hyperlipidemia: On 10 mg Crestor nightly.    Objective   Vital Signs:  /74 (BP Location: Left arm, Patient Position: Sitting, Cuff Size: Adult)   Pulse 76   Resp 18   Ht 165.1 cm (65\")   Wt 61 kg (134 lb 6.4 oz)   SpO2 98%   BMI 22.37 kg/m²   Estimated body mass index is 22.37 kg/m² as calculated from the following:    Height as of this encounter: 165.1 cm (65\").    Weight as of this encounter: 61 kg (134 lb 6.4 oz).    BMI is within normal parameters. No other follow-up for BMI required.      Physical Exam  Vitals reviewed.   Constitutional:       General: She is not in acute distress.     Appearance: Normal appearance.   Cardiovascular:      Rate and Rhythm: Normal rate and regular rhythm.   Pulmonary:      Effort: Pulmonary effort is normal. No respiratory distress.   Skin:     General: Skin is warm and dry.   Neurological:      Mental Status: She is alert and oriented to person, place, and time.   Psychiatric:         Mood and Affect: Mood normal.         Thought Content: Thought content normal.         Judgment: Judgment normal.        Result Review :                Assessment and Plan   Diagnoses and all orders for this visit:    1. Mixed hyperlipidemia (Primary)  -     Comprehensive Metabolic Panel; Future  -     Lipid Panel With / Chol / HDL Ratio; Future    2. Lymphopenia  -     CBC & Differential; Future  -     Vitamin B12; Future    3. Vitamin D deficiency  -     Vitamin D,25-Hydroxy; Future    Will review fasting labs " in the next week and may change to 5 mg dose of rosuvastatin pending LDL       Follow Up   Return in about 1 year (around 3/14/2026) for Medicare Wellness.  Patient was given instructions and counseling regarding her condition or for health maintenance advice. Please see specific information pulled into the AVS if appropriate.

## 2025-03-17 NOTE — PATIENT INSTRUCTIONS
Important information to be aware of as a [place] [facility] [place]    All routine health maintenance, refills, changes to medications or changes to treatment plan need to be addressed by myself as your PCP.    Acute illnesses (ex: colds, URI, UTI) can be addressed by another provider in our office if they have a same day appointment available. If a same day appointment is not available at your convenience please feel free to visit [facility] on the first floor if needed .  Completion/Review of paperwork require an appointment and may have additional charges.     The best way to get a hold of provider is to call the office during normal business hours, M-F 8:00-4:00 at [phone] and ask to speak to my medical assistant Adrian. Provider does not work on Thursdays and will not return calls until the following day.  Please arrive in person or virtually 10-15 minutes prior to appointment to allow for registration/sign in/questionnaires.  If you are seen virtually please review after visit summary (AVS)/patient instructions via Route[employer] for a summary of plan of care/changes in treatment plan.   If you are having difficulties logging on or accessing Routezilla please contact the office for assistance.  Please request a refill through pharmacy or via Routezilla prior to contacting office (unless a controlled substance is prescribed - you must call).   If a referral is placed, please give the office staff time to place referral to appropriate provider.  If you have not heard anything within 2 weeks please contact my office to follow-up on status of the referral.  Medication decisions and care are based upon the discretion of the provider based on their judgement and expertise.  Please treat our providers with the respect you would expect to be treated with during office visits.    No show policy:  We understand unexpected circumstances arise; however, anytime you miss your appointment we are unable to provide you appropriate  "care.  In addition, each appointment missed could have been used to provide care for others.  We ask that you call at least 24 hours in advance to cancel or reschedule an appointment. We would like to take this opportunity to remind you of our policy stating patients who miss THREE or more appointments without cancelling or rescheduling 24 hours in advance of the appointment may be subject to cancellation of any further visits with our clinic. Please call [phone] to reschedule your appointment. If there are reasons that make it difficult for you to keep the appointments, please call and let us know how we can help. Please understand that medication prescribing will not continue without seeing your provider.       MyChart/Telephone call/Lab results Tips:     MyChart and telephone calls are a useful part of our patient care program and is a way for us to communicate lab results and for you to request refills.  Not all medical questions are appropriate for MyChart such as new medical issues that require taking a history, performing an exam, getting labs/studies or researching medical questions needed to be addressed in the office.  Similarly, telephone calls should not be used for new problems requiring an evaluation as well.     Examples of medical issues that are APPROPRIATE for MyChart and telephone calls:  -Follow-up on problems we have already addressed in a visit such as home testing results, blood pressure readings, glucose readings  -Questions that can be answered with a simple \"yes\" or \"no\"     Communication that is NOT APPROPRIATE for MyChart:  -New problems, serious problems or urgent problems.  Urgent matters should be addressed by phone for advice, in the office, urgent care or the ER.  -Requesting COVID or bacterial infection treatments: These types of problems require an evaluation.    -[employer] messages are not email.  Staff will check messages each weekday.  We strive for a 48-hour turnaround on " messaging.    -PlayJamt is not for private issues.  Messages are received first by our office staff.     Please allow up to 7 business days for lab results to be sent through [employer] to you before contacting your provider.  Sometimes we are waiting for results to get back from the lab and also your provider needs time to analyze them thoroughly before making recommendations.  While the internet has great resources, it is not a substitute for interpreting lab results.     We also ask that you not send [email or web address] messages and telephone calls regarding the same issue simultaneously.  This slows down the process of returning your call/message and confuses staff.     Be mindful that [employer] messages and telephone calls become part of your permanent medical record.     Your provider cannot access your MyChart.  It is a service which communicates with the EHR (Electronic Health Record).  Sometimes there are errors in [employer] that staff and providers cannot see and these errors are not part of your EHR.  Vaccines and screening reminders have been frequently incorrect in [employer].     Per Dr. York, when sending messages via [employer], please be as concise and accurate as possible.  Much of our time each day is utilized looking up information for patients only to find out that it was another practice or pharmacy responsible for the issue.     When using the messaging, please use it for short and simple questions.  Anything further than that should go through the phone system, or better yet, addressed in a visit.       We appreciate your respect for the limitations and boundaries of [employer] and the telephone answering service.     Thank you,  Dr. York

## 2025-06-05 ENCOUNTER — OFFICE VISIT (OUTPATIENT)
Dept: GASTROENTEROLOGY | Facility: CLINIC | Age: 75
End: 2025-06-05
Payer: MEDICARE

## 2025-06-05 VITALS
SYSTOLIC BLOOD PRESSURE: 130 MMHG | HEIGHT: 65 IN | DIASTOLIC BLOOD PRESSURE: 80 MMHG | WEIGHT: 132 LBS | BODY MASS INDEX: 21.99 KG/M2

## 2025-06-05 DIAGNOSIS — K58.0 IRRITABLE BOWEL SYNDROME WITH DIARRHEA: Primary | Chronic | ICD-10-CM

## 2025-06-05 DIAGNOSIS — Z86.0100 HISTORY OF COLONIC POLYPS: ICD-10-CM

## 2025-06-05 PROCEDURE — 1159F MED LIST DOCD IN RCRD: CPT | Performed by: INTERNAL MEDICINE

## 2025-06-05 PROCEDURE — 1160F RVW MEDS BY RX/DR IN RCRD: CPT | Performed by: INTERNAL MEDICINE

## 2025-06-05 PROCEDURE — 99213 OFFICE O/P EST LOW 20 MIN: CPT | Performed by: INTERNAL MEDICINE

## 2025-06-05 RX ORDER — DICYCLOMINE HYDROCHLORIDE 10 MG/1
10 CAPSULE ORAL 4 TIMES DAILY PRN
Qty: 120 CAPSULE | Refills: 5 | Status: SHIPPED | OUTPATIENT
Start: 2025-06-05

## 2025-06-05 RX ORDER — ALOSETRON HYDROCHLORIDE 0.5 MG/1
0.5 TABLET ORAL DAILY
Qty: 90 TABLET | Refills: 3 | Status: SHIPPED | OUTPATIENT
Start: 2025-06-05

## 2025-06-05 NOTE — PROGRESS NOTES
PATIENT INFORMATION  Lakeisha Gunn       - 1950    CHIEF COMPLAINT  Chief Complaint   Patient presents with    Irritable Bowel Syndrome    Diarrhea    Heartburn    Colonic adenomas       HISTORY OF PRESENT ILLNESS  Here for follow up for her polyps from 2024 to 2027    Her script for alosetron was interupted and didn't have it for a week and was miserable with diarrhea and pain but with in 2 day ofrestarting she was back to her baseline    Maintained on one 0.5mg dose daily for now        REVIEWED PERTINENT RESULTS/ LABS  Lab Results   Component Value Date    CASEREPORT  2024     Surgical Pathology Report                         Case: [id number]                                  Authorizing Provider:  Noe Salinas        Collected:           2024 08:49 AM                                 MD Larry                                                                   Ordering Location:     [facility] SURGERY     Received:            2024 09:46 AM                                 [facility] MAIN OR                                                     Pathologist:           Samuel Christie MD                                                         Specimens:   1) - Large Intestine, Sigmoid Colon, Sigmoid Polyp                                                  2) - Large Intestine, Transverse Colon, Transverse Polyp x3                                FINALDX  2024     1.  Colon, sigmoid, biopsy: Tubular adenoma    2.  Colon, transverse, biopsy: Tubular adenomas       Lab Results   Component Value Date    HGB 14.5 2025    MCV 98.4 (H) 2025     2025    ALT 21 2025    AST 20 2025    TRIG 121 2025      No results found.    REVIEW OF SYSTEMS  Review of Systems   Constitutional:  Negative for activity change, chills, fever and unexpected weight change.   HENT:  Negative for congestion.    Eyes:  Negative for visual disturbance.    Respiratory:  Negative for shortness of breath.    Cardiovascular:  Negative for chest pain and palpitations.   Gastrointestinal:  Negative for abdominal pain and blood in stool.   Endocrine: Negative for cold intolerance and heat intolerance.   Genitourinary:  Negative for hematuria.   Musculoskeletal:  Negative for gait problem.   Skin:  Negative for color change.   Allergic/Immunologic: Negative for immunocompromised state.   Neurological:  Negative for weakness and light-headedness.   Hematological:  Negative for adenopathy.   Psychiatric/Behavioral:  Negative for sleep disturbance. The patient is not nervous/anxious.          ACTIVE PROBLEMS  Patient Active Problem List    Diagnosis     Personal history of colonic polyps [Z86.0100]     COVID [U07.1]     Lymphopenia [D72.810]     Irritable bowel syndrome with diarrhea [K58.0]     Conductive hearing loss of left ear [H90.12]     Diverticular disease [K57.90]     Asthma [J45.909]     Hyperlipidemia [E78.5]          PAST MEDICAL HISTORY  Past Medical History:   Diagnosis Date    Actinic keratosis     Anesthesia complication     SLOW TO WAKE UP.    Asthma     Washington's esophagus without dysplasia     Cellulitis of head (any part, except face)     Cholesteatoma of ear, left     Chronic mastoiditis of left side     Otolaryngology    Conductive hearing loss of left ear 08/01/2018    COVID 05/25/2022    Dx 5/24/2022    Diverticular disease     Early satiety     Gastroparesis     GERD (gastroesophageal reflux disease)     H/O Helicobacter infection     Hearing loss     Hemorrhoids     Hiatal hernia     Hyperlipidemia     Irritable bowel syndrome with diarrhea     Lymphocytopenia     Lymphopenia 09/20/2021    Pt states years ago issues with a fever, infection.      Mixed conductive and sensorineural hearing loss, unilateral, left ear with restricted hearing on the contralateral side     Unilateral mixed conductive and sensorineural hearing loss of left ear with  restricted hearing on the contralateral side    Mixed conductive and sensorineural hearing loss, unilateral, left ear, with unrestricted hearing on the contralateral side     Unilateral mixed conductive and sensorineural hearing loss of left ear with restricted hearing on the contralateral side    Myringitis of left ear     Osteopenia 2023    Paraesophageal hernia 02/26/2015    Findings via [facility] radiology. 5 cm in size    Parotitis     Perforated eardrum, left     MULTIPLE RECONSTRUCTIVE PROCEDURES, INCLUDING TYMPANOMASTOIDECTOMY X2    Scoliosis     Spondylosis     Unilateral sensorineural hearing loss     Unilateral sensorineural hearing loss of right ear with restricted hearing on the contralateral side (Primary Dx);    Wears contact lenses     Wears glasses          SURGICAL HISTORY  Past Surgical History:   Procedure Laterality Date    APPENDECTOMY      COLONOSCOPY N/A 09/27/2017    Diverticulosis in sigmoid/descending colon - Bx: , [facility]. Dr. Ye Haywood.    COLONOSCOPY N/A 04/19/2024    Procedure: COLONOSCOPY to Cecum with Polypectomy;  Surgeon: Noe Salinas MD;  Location: [facility] OR;  Service: Gastroenterology;  Laterality: N/A;  Sigmoid Polyp with A Cold Snare, Tranvserse Polyp x3    EAR MASTOIDECTOMY W/ COCHLEAR IMPLANT W/ LANDMARK Left 01/29/2019    Iraj Bautista MD    ENDOSCOPY N/A 10/13/2020    Stomach Bx: Mild chronic inflammation. Esophagus Bx: Chronic inflammation in the gastric mucosa. Procedure: ESOPHAGOGASTRODUODENOSCOPY WITH BIOPSIES;  Surgeon: Noe Salinas MD;  Location: [facility] ENDOSCOPY;  Service: Gastroenterology;  Laterality: N/A;  PRE OP - h/O Anna'SPOST OP - ANNA'S,  FUNDAL PLICATION GASTRITIS    ENDOSCOPY N/A 03/03/2015    Anna's esophagus, A small paraesophageal hernia, stomach exam normal, duodenum exam normal, Nissen fundoplication found, wrap appears tight. [facility]. Dr. Ye Haywood.     ENDOSCOPY N/A 09/27/2017    Z line found at 39 cm -Bx: Entire stomach normal - Bx: H-pylori gastritis, Duodenum normal - Bx: Benign. [facility], Dr. Ye Haywood.    ENDOSCOPY AND COLONOSCOPY N/A 12/30/2013    A 1 cm Tubular Adenoma polyp in cecum. [place]'s esophagus. Duo bx: benign, Gastric bx; benign, Dr. Ye Haywood.    ESOPHAGEAL MANOMETRY N/A 02/05/2014    Plus/minus abnormalities of the lower/upper esophageal sphincter consistent with Reflux disease, abnormal body consistent with reflus disease & previous abnormal de la garza ph study. [facility], Dr. Ye Haywood.    EXTERNAL EAR SURGERY      HERNIA REPAIR N/A 03/12/2015    S/P NISSEN PROCEDURE X2. Antoni Haywood surgeon.    INNER EAR SURGERY      MULTIPLE RECONSTRUCTIVE PROCEDURES, INCLUDING TYMPANOMASTOIDECTOMY X2    NISSEN FUNDOPLICATION N/A 03/13/2014    NISSEN FUNDOPLICATION N/A 03/2015    OTHER SURGICAL HISTORY N/A 06/26/2015    UPPER GI SERIES W/FLUROSCOPY. Stomach normal rugal fold thickness w/out ulceration or mass. No recurrent hernia seen. Dr. Ye Haywood.    TONSILLECTOMY      TUBAL ABDOMINAL LIGATION  1981    UPPER GASTROINTESTINAL ENDOSCOPY  2017         FAMILY HISTORY  Family History   Adopted: Yes   Problem Relation Age of Onset    Cancer Daughter     Vaginal cancer Daughter     Vaginal cancer Daughter     Cancer Daughter     Colon cancer Neg Hx     Colon polyps Neg Hx          SOCIAL HISTORY  Social History     Occupational History    Not on file   Tobacco Use    Smoking status: Never     Passive exposure: Never    Smokeless tobacco: Never   Vaping Use    Vaping status: Never Used   Substance and Sexual Activity    Alcohol use: Yes     Alcohol/week: 5.0 standard drinks of alcohol     Types: 2 Glasses of wine, 3 Drinks containing 0.5 oz of alcohol per week    Drug use: Never    Sexual activity: Yes     Partners: Male     Birth control/protection: Post-menopausal     Comment:  TO Ruel Gunn.  "        CURRENT MEDICATIONS    Current Outpatient Medications:     alosetron (LOTRONEX) 0.5 MG tablet, Take 1 tablet by mouth Daily., Disp: 90 tablet, Rfl: 3    esomeprazole (nexIUM) 40 MG capsule, Take 1 capsule by mouth Daily., Disp: 90 capsule, Rfl: 0    estradiol (VAGIFEM) 10 MCG tablet vaginal tablet, Insert 1 tablet into the vagina 2 (Two) Times a Week., Disp: , Rfl:     ibuprofen (ADVIL,MOTRIN) 200 MG tablet, Take  by mouth Every 8 (Eight) Hours As Needed., Disp: , Rfl:     multivitamin with minerals tablet tablet, Take 1 tablet by mouth Daily., Disp: , Rfl:     rosuvastatin (Crestor) 10 MG tablet, Take 1 tablet by mouth Every Night., Disp: 90 tablet, Rfl: 3    dicyclomine (BENTYL) 10 MG capsule, Take 1 capsule by mouth 4 (Four) Times a Day As Needed (diarrhea)., Disp: 120 capsule, Rfl: 5    ALLERGIES  Patient has no known allergies.    VITALS  Vitals:    06/05/25 1025   BP: 130/80   Weight: 59.9 kg (132 lb)   Height: 165.1 cm (65\")       PHYSICAL EXAM  Debilities/Disabilities Identified: None  Emotional Behavior: Appropriate  Wt Readings from Last 3 Encounters:   06/05/25 59.9 kg (132 lb)   03/14/25 61 kg (134 lb 6.4 oz)   04/19/24 58.2 kg (128 lb 3.2 oz)     Ht Readings from Last 1 Encounters:   06/05/25 165.1 cm (65\")     Body mass index is 21.97 kg/m².  Physical Exam  Constitutional:       Appearance: She is well-developed. She is not diaphoretic.   HENT:      Head: Normocephalic and atraumatic.   Eyes:      General: No scleral icterus.     Conjunctiva/sclera: Conjunctivae normal.      Pupils: Pupils are equal, round, and reactive to light.   Neck:      Thyroid: No thyromegaly.   Cardiovascular:      Rate and Rhythm: Normal rate and regular rhythm.      Heart sounds: Normal heart sounds. No murmur heard.     No gallop.   Pulmonary:      Effort: Pulmonary effort is normal.      Breath sounds: Normal breath sounds. No wheezing or rales.   Abdominal:      General: Bowel sounds are normal. There is no " distension or abdominal bruit.      Palpations: Abdomen is soft. There is no shifting dullness, fluid wave or mass.      Tenderness: There is no abdominal tenderness. There is no guarding. Negative signs include Rust's sign.      Hernia: There is no hernia in the ventral area.   Musculoskeletal:         General: Normal range of motion.      Cervical back: Normal range of motion and neck supple.   Lymphadenopathy:      Cervical: No cervical adenopathy.   Skin:     General: Skin is warm and dry.      Findings: No erythema or rash.   Neurological:      Mental Status: She is alert and oriented to person, place, and time.   Psychiatric:         Mood and Affect: Mood normal.         Behavior: Behavior normal.         CLINICAL DATA REVIEWED   reviewed previous lab results and integrated with today's visit, reviewed notes from other physicians and/or last GI encounter, reviewed previous endoscopy results and available photos, reviewed surgical pathology results from previous biopsies    ASSESSMENT  Diagnoses and all orders for this visit:    Irritable bowel syndrome with diarrhea  -     alosetron (LOTRONEX) 0.5 MG tablet; Take 1 tablet by mouth Daily.    Personal history of colonic polyps    Other orders  -     dicyclomine (BENTYL) 10 MG capsule; Take 1 capsule by mouth 4 (Four) Times a Day As Needed (diarrhea).          PLAN  Return in about 1 year (around 6/5/2026).    I have discussed the above plan with the patient.  They verbalize understanding and are in agreement with the plan.  They have been advised to contact the office for any questions, concerns, or changes related to their health.

## 2025-06-10 ENCOUNTER — TRANSCRIBE ORDERS (OUTPATIENT)
Age: 75
End: 2025-06-10
Payer: MEDICARE

## 2025-06-10 DIAGNOSIS — M17.12 PRIMARY OSTEOARTHRITIS OF LEFT KNEE: Primary | ICD-10-CM

## 2025-06-10 DIAGNOSIS — M24.572 EQUINUS CONTRACTURE OF LEFT ANKLE: ICD-10-CM

## 2025-06-12 ENCOUNTER — TELEPHONE (OUTPATIENT)
Dept: GASTROENTEROLOGY | Facility: CLINIC | Age: 75
End: 2025-06-12
Payer: MEDICARE

## 2025-06-12 DIAGNOSIS — K22.70 BARRETT'S ESOPHAGUS WITHOUT DYSPLASIA: ICD-10-CM

## 2025-06-12 RX ORDER — ESOMEPRAZOLE MAGNESIUM 40 MG/1
40 CAPSULE, DELAYED RELEASE ORAL DAILY
Qty: 90 CAPSULE | Refills: 3 | Status: CANCELLED | OUTPATIENT
Start: 2025-06-12

## 2025-06-12 RX ORDER — DICYCLOMINE HYDROCHLORIDE 10 MG/1
10 CAPSULE ORAL 4 TIMES DAILY PRN
Qty: 120 CAPSULE | Refills: 5 | Status: CANCELLED | OUTPATIENT
Start: 2025-06-12

## 2025-06-12 NOTE — TELEPHONE ENCOUNTER
dicyclomine (BENTYL) 10 MG capsule [0950] (Order [id number])     SHE IS REQUESTING THIS RX BE SENT TO [facility]:    [email or web address]  [address], [place] [address] · [address]  [phone]    PHARM #[phone]    &    esomeprazole (nexIUM) 40 MG capsule [85052] (Order [id number])     BE SENT TO [email or web address]    HER PHONE [phone]

## 2025-06-13 RX ORDER — DICYCLOMINE HYDROCHLORIDE 10 MG/1
10 CAPSULE ORAL 4 TIMES DAILY PRN
Qty: 120 CAPSULE | Refills: 5 | Status: SHIPPED | OUTPATIENT
Start: 2025-06-13

## 2025-06-16 DIAGNOSIS — K22.70 BARRETT'S ESOPHAGUS WITHOUT DYSPLASIA: ICD-10-CM

## 2025-06-16 RX ORDER — ESOMEPRAZOLE MAGNESIUM 40 MG/1
40 CAPSULE, DELAYED RELEASE ORAL DAILY
Qty: 90 CAPSULE | Refills: 3 | Status: SHIPPED | OUTPATIENT
Start: 2025-06-16

## 2025-07-02 ENCOUNTER — TREATMENT (OUTPATIENT)
Age: 75
End: 2025-07-02
Payer: MEDICARE

## 2025-07-02 DIAGNOSIS — Z78.9 DEFICIT IN ACTIVITIES OF DAILY LIVING (ADL): Primary | ICD-10-CM

## 2025-07-02 DIAGNOSIS — R29.898 DECREASED STRENGTH OF LOWER EXTREMITY: ICD-10-CM

## 2025-07-02 DIAGNOSIS — R26.89 IMPAIRED GAIT AND MOBILITY: ICD-10-CM

## 2025-07-02 DIAGNOSIS — M24.572 EQUINUS CONTRACTURE OF LEFT ANKLE: ICD-10-CM

## 2025-07-02 DIAGNOSIS — M25.562 CHRONIC PAIN OF LEFT KNEE: ICD-10-CM

## 2025-07-02 DIAGNOSIS — M17.12 PRIMARY OSTEOARTHRITIS OF LEFT KNEE: ICD-10-CM

## 2025-07-02 DIAGNOSIS — G89.29 CHRONIC PAIN OF LEFT KNEE: ICD-10-CM

## 2025-07-02 NOTE — PATIENT INSTRUCTIONS
Access Code: [email or web address]  URL: [email or web address]  Date: 07/02/2025  Prepared by: Marie Kelly    Exercises  - Supine Posterior Pelvic Tilt with Pelvic Floor Contraction  - 1 x daily - 7 x weekly - 1 sets - 10 reps - 10 sec. hold  - Supine Hip Adduction Isometric with Ball  - 1 x daily - 7 x weekly - 1 sets - 10 reps - 5 sec. hold  - Supine Straight Leg Hip Adduction and Quad Set with Ball  - 1 x daily - 7 x weekly - 1 sets - 10 reps - 5 sec. hold  - Prone Knee Flexion AROM  - 1 x daily - 7 x weekly - 3 sets - 10 reps  - Long Sitting Straight Leg Raise with External Rotation (Mirrored)  - 1 x daily - 7 x weekly - 3 sets - 10 reps  - Long Sitting Plantar Fascia Stretch with Towel  - 1 x daily - 7 x weekly - 1 sets - 4 reps - 20-30 sec. hold

## 2025-07-02 NOTE — PROGRESS NOTES
Physical Therapy Initial Evaluation and Plan of Care  [facility] Physical Therapy [place]   [address] [place] [address]  P: [phone]       F: [phone]       Patient: Lakeisha Gunn   : 1950  Visit Diagnoses:     ICD-10-CM ICD-9-CM   1. Deficit in activities of daily living (ADL)  Z78.9 V49.89   2. Primary osteoarthritis of left knee  M17.12 715.16   3. Equinus contracture of left ankle  M24.572 718.47   4. Decreased strength of lower extremity  R29.898 729.89   5. Chronic pain of left knee  M25.562 719.46    G89.29 338.29   6. Impaired gait and mobility  R26.89 781.2     Referring practitioner: Solo Priest MD  Date of Initial Visit: 2025  Today's Date: 2025  Patient seen for 1 sessions           Subjective Questionnaire: WOMAC: 51/96      Subjective Evaluation    History of Present Illness  Date of onset: 6/10/2025  Mechanism of injury: Patient reports 2 yr history of left knee pain 2023 fell while [place]  Was able to continue hike but pain persisted and then he went to [facility] he had 3 Baker's cysts in her knee.  Symptoms resolved with steroid injection in the knee.      Last year went to [place]-about a month prior pain returned.  Had steroid injection 2024.  Had short term relief but still felt the pain.      On 2025 had MRI which was unremarkable other than slight arthritis.  Did not show any Baker's Cysts.    Pain moves around some.  States the doctor told her she has an unusually tight calf muscle.  Has tried ice and rest since Feb.  Reports the pain has continued to get worse.    Most of the pain is in the back of the knee, has swelling.  Worst sitting, sleeping, going up stairs, prolonged postures with the knee bent. Walking-small bouts, feels better.  States her knee feels weak and with higher pain levels she limps.  Overall her knee feels unstable.      Stays active and hikes often.  Works with a  at  [employer].    Subjective comment: Patient reports left knee pain.  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  At best pain ratin (only at rest with knee extended and supported)  At worst pain ratin  Location: Lef tknee  Quality: throbbing (constant)  Relieving factors: ice and medications (Advil or tylenol prn)  Aggravating factors: standing, ambulation, prolonged positioning, sleeping, stairs, squatting and movement (ladders)    Social Support  Lives in: multiple-level home  Lives with: spouse    Hand dominance: right    Treatments  Previous treatment: injection treatment  Patient Goals  Patient goals for therapy: decreased pain, increased strength and return to sport/leisure activities         Past Medical History:   Diagnosis Date    Actinic keratosis     Anesthesia complication     SLOW TO WAKE UP.    Asthma     Washington's esophagus without dysplasia     Cellulitis of head (any part, except face)     Cholesteatoma of ear, left     Chronic mastoiditis of left side     Otolaryngology    Conductive hearing loss of left ear 2018    COVID 2022    Dx 2022    Diverticular disease     Early satiety     Gastroparesis     GERD (gastroesophageal reflux disease)     H/O Helicobacter infection     Hearing loss     Hemorrhoids     Hiatal hernia     Hyperlipidemia     Irritable bowel syndrome with diarrhea     Lymphocytopenia     Lymphopenia 2021    Pt states years ago issues with a fever, infection.      Mixed conductive and sensorineural hearing loss, unilateral, left ear with restricted hearing on the contralateral side     Unilateral mixed conductive and sensorineural hearing loss of left ear with restricted hearing on the contralateral side    Mixed conductive and sensorineural hearing loss, unilateral, left ear, with unrestricted hearing on the contralateral side     Unilateral mixed conductive and sensorineural hearing loss of left ear with restricted hearing  on the contralateral side    Myringitis of left ear     Osteopenia 2023    Paraesophageal hernia 02/26/2015    Findings via [facility] radiology. 5 cm in size    Parotitis     Perforated eardrum, left     MULTIPLE RECONSTRUCTIVE PROCEDURES, INCLUDING TYMPANOMASTOIDECTOMY X2    Scoliosis     Spondylosis     Unilateral sensorineural hearing loss     Unilateral sensorineural hearing loss of right ear with restricted hearing on the contralateral side (Primary Dx);    Wears contact lenses     Wears glasses      Past Surgical History:   Procedure Laterality Date    APPENDECTOMY      COLONOSCOPY N/A 09/27/2017    Diverticulosis in sigmoid/descending colon - Bx: , [facility]. Dr. eY Haywood.    COLONOSCOPY N/A 04/19/2024    Procedure: COLONOSCOPY to Cecum with Polypectomy;  Surgeon: Noe Salinas MD;  [place] [facility] [place];  Service: Gastroenterology;  Laterality: N/A;  Sigmoid Polyp with A Cold Snare, Tranvserse Polyp x3    EAR MASTOIDECTOMY W/ COCHLEAR IMPLANT W/ LANDMARK Left 01/29/2019    Iraj Bautista MD    ENDOSCOPY N/A 10/13/2020    Stomach Bx: Mild chronic inflammation. Esophagus Bx: Chronic inflammation in the gastric mucosa. Procedure: ESOPHAGOGASTRODUODENOSCOPY WITH BIOPSIES;  Surgeon: Noe Salinas MD;  Location: [facility] ENDOSCOPY;  Service: Gastroenterology;  Laterality: N/A;  PRE OP - h/O Anna'SPOST OP - ANNA'S,  FUNDAL PLICATION GASTRITIS    ENDOSCOPY N/A 03/03/2015    Anna's esophagus, A small paraesophageal hernia, stomach exam normal, duodenum exam normal, Nissen fundoplication found, wrap appears tight. [facility]. Dr. Ye Haywood.    ENDOSCOPY N/A 09/27/2017    Z line found at 39 cm -Bx: Entire stomach normal - Bx: H-pylori gastritis, Duodenum normal - Bx: Benign. [facility], Dr. eY Haywood.    ENDOSCOPY AND COLONOSCOPY N/A 12/30/2013    A 1 cm Tubular Adenoma polyp in cecum. Anna's esophagus. Duo bx:  benign, Gastric bx; benign, Dr. Ye Haywood.    ESOPHAGEAL MANOMETRY N/A 02/05/2014    Plus/minus abnormalities of the lower/upper esophageal sphincter consistent with Reflux disease, abnormal body consistent with reflus disease & previous abnormal de la garza ph study. [facility], Dr. Ye Haywood.    EXTERNAL EAR SURGERY      HERNIA REPAIR N/A 03/12/2015    S/P NISSEN PROCEDURE X2. Antoni Haywood surgeon.    INNER EAR SURGERY      MULTIPLE RECONSTRUCTIVE PROCEDURES, INCLUDING TYMPANOMASTOIDECTOMY X2    NISSEN FUNDOPLICATION N/A 03/13/2014    NISSEN FUNDOPLICATION N/A 03/2015    OTHER SURGICAL HISTORY N/A 06/26/2015    UPPER GI SERIES W/FLUROSCOPY. Stomach normal rugal fold thickness w/out ulceration or mass. No recurrent hernia seen. Dr. Ye Haywood.    TONSILLECTOMY      TUBAL ABDOMINAL LIGATION  1981    UPPER GASTROINTESTINAL ENDOSCOPY  2017         Objective          Observations     Additional Knee Observation Details  Prominent veins with TTP.    Palpation   Left   Hypertonic in the distal biceps femoris, distal semimembranosus, distal semitendinosus, lateral gastrocnemius, medial gastrocnemius, TFL and vastus lateralis.   Tenderness of the distal biceps femoris, distal semimembranosus, distal semitendinosus, gluteus medius, lateral gastrocnemius, medial gastrocnemius, piriformis, TFL and vastus lateralis.   Trigger point to TFL.     Tenderness     Left Hip   Tenderness in the greater trochanter.   Left Knee   Tenderness in the pes anserinus and popliteal fossa.     Active Range of Motion   Left Hip   Normal active range of motion    Right Hip   Normal active range of motion  Left Knee   Normal active range of motion    Right Knee   Normal active range of motion    Patellar Mobility   Left Knee Patellar tendons within functional limits include the medial, lateral, superior and inferior.     Right Knee Patellar tendons within functional limits include the medial, lateral, superior and  inferior.     Strength/Myotome Testing     Left Hip   Planes of Motion   Abduction: 4-    Left Knee   Flexion: 4  Extension: 4  Left knee quadriceps contraction strength: Delayed VMO activation.    Right Knee   Flexion: 5  Extension: 5    Tests     Additional Tests Details  Negative Carina's    SIJ malalignment      Left Knee Flexibility Comments:   SLR: 60 degrees    Decreased hip girdle muscle flexibility.    Right Knee Flexibility Comments:   SLR: 90 degrees    Ambulation     Observational Gait   Gait: antalgic and asymmetric     Functional Assessment   Squat   Pain and sitting toward right side.     Single Leg Stance   Left: 3 (Unsteady, knee hyperextension) seconds  Right: 27 (mild LE wobbling) seconds        Assessment & Plan       Assessment  Impairments: abnormal gait, activity intolerance, impaired physical strength, lacks appropriate home exercise program, pain with function and weight-bearing intolerance   Functional limitations: walking, uncomfortable because of pain and standing   Assessment details: Lakeisha Gunn is a pleasant 75 y.o. female that presents with left knee pain, SIJ malalignment, decreased hip girdle/LE muscle flexibility, decreased hip/quad muscle strength, increased muscle hypertonicity left LE, impaired gait and pain limited functional activity tolerance and impaired restorative sleep. Pt will benefit from skilled PT services in order to address listed impairments, decrease pain and restore function.    Prognosis: good  Prognosis details: Patient demonstrates good rehab potential as evidenced by high motivation to participate with PT POC and to return to PLOF/ADLs/IADLs.    Goals  Plan Goals: Short Term Goals (4 wks):  1.  Patient will have improved left quad/VMO activation to WNL.  2.  Patient will have normalized muscle tone in left hip girdle/LE muscles.  3.  Patient will have symmetrical SIJ alignment.  4.  Patient will be independent in HEP.  5.  Patient will have symmetrical  functional squat form for improved stability with performance of ADLs/IADLs.      Long Term Goals (8 wks):  1.  Patient will have increased left hip strength to 5/5 for improved stability with performance of ADLs/IADLs.  2.  Patient will have left knee strength of 5/5 for improved stability with performance of ADLs/IADLs.  3.  Patient will have improved WOMAC score of 30/96 or better.  4.  Patient will have improved SLS time of 30 seconds for improved stability with performance of ADLs/IADLs.  5.  Patient will have normalized gait pattern.      Plan  Therapy options: will be seen for skilled therapy services  Planned modality interventions: thermotherapy (hydrocollator packs), cryotherapy and dry needling  Planned therapy interventions: manual therapy, soft tissue mobilization, strengthening, stretching, balance/weight-bearing training, flexibility, functional ROM exercises, gait training, joint mobilization, home exercise program, abdominal trunk stabilization, body mechanics training, neuromuscular re-education and therapeutic activities  Frequency: 1x week  Duration in weeks: 8  Treatment plan discussed with: patient  Plan details: Pt was educated on the importance of their HEP and their current need for continued skilled physical therapy. Patients goals and potential limitations were discussed and pt is in agreement with current plan of care and treatment emphasis.                History # of Personal Factors and/or Comorbidities: HIGH (3+)  Examination of Body System(s): # of elements: HIGH (4+)  Clinical Presentation: STABLE   Clinical Decision Making: HIGH      Timed:         Manual Therapy:    8     mins  30341;     Therapeutic Exercise:    20     mins  20870;     Neuromuscular Oscar:        mins  57554;    Therapeutic Activity:     10     mins  62879;     Gait Training:           mins  04880;     Ultrasound:          mins  28713;    Ionto                                  mins  48714  Self Care                             mins  03811  Canalith Repos         mins  11648  Orthotic MGMT/Train         mins  89174    Un-Timed:  Electrical Stimulation:         mins  24817 ( );  Dry Needling:          mins  36748 self-pay;  Dry Needling:          mins  98984 self-pay  Traction          mins  41434  Low Eval          mins  15355  Mod Eval          mins  47983  High Eval                       40     mins  61285    Timed Treatment:   38   mins   Total Treatment:     88   mins      PT SIGNATURE: Marie Kelly PT     License Number: [id number]  Electronically signed by Marie Kelly PT, 07/02/25, 9:40 AM EDT      DATE TREATMENT INITIATED: 7/2/2025    Initial Certification  Certification Period: 7/2/2025 thru 9/29/2025  I certify that the therapy services are furnished while this patient is under my care.  The services outlined above are required by this patient, and will be reviewed every 90 days.     PHYSICIAN: Solo Priest MD      NPI: [id number]  DATE:         Please sign and return via fax to [phone]. Thank you, [facility] Physical Therapy.

## 2025-07-15 ENCOUNTER — TREATMENT (OUTPATIENT)
Age: 75
End: 2025-07-15
Payer: MEDICARE

## 2025-07-15 DIAGNOSIS — G89.29 CHRONIC PAIN OF LEFT KNEE: ICD-10-CM

## 2025-07-15 DIAGNOSIS — M17.12 PRIMARY OSTEOARTHRITIS OF LEFT KNEE: Primary | ICD-10-CM

## 2025-07-15 DIAGNOSIS — Z78.9 DEFICIT IN ACTIVITIES OF DAILY LIVING (ADL): ICD-10-CM

## 2025-07-15 DIAGNOSIS — R26.89 IMPAIRED GAIT AND MOBILITY: ICD-10-CM

## 2025-07-15 DIAGNOSIS — M24.572 EQUINUS CONTRACTURE OF LEFT ANKLE: ICD-10-CM

## 2025-07-15 DIAGNOSIS — R29.898 DECREASED STRENGTH OF LOWER EXTREMITY: ICD-10-CM

## 2025-07-15 DIAGNOSIS — M25.562 CHRONIC PAIN OF LEFT KNEE: ICD-10-CM

## 2025-07-15 PROCEDURE — 97140 MANUAL THERAPY 1/> REGIONS: CPT | Performed by: PHYSICAL THERAPIST

## 2025-07-15 PROCEDURE — 97110 THERAPEUTIC EXERCISES: CPT | Performed by: PHYSICAL THERAPIST

## 2025-07-15 NOTE — PROGRESS NOTES
Physical Therapy Treatment Note  [facility] Physical Therapy [place]   [address] [place] [address]  P: [phone]       F: [phone]      Patient: Lakeisha Gunn   : 1950  Treatment Diagnosis:     ICD-10-CM ICD-9-CM   1. Primary osteoarthritis of left knee  M17.12 715.16   2. Equinus contracture of left ankle  M24.572 718.47   3. Decreased strength of lower extremity  R29.898 729.89   4. Chronic pain of left knee  M25.562 719.46    G89.29 338.29   5. Impaired gait and mobility  R26.89 781.2   6. Deficit in activities of daily living (ADL)  Z78.9 V49.89     Referring practitioner: Solo Priest MD  Date of Initial Visit: Type: THERAPY  Noted: 2025  Today's Date: 7/15/2025  Patient seen for 2 sessions           Subjective   Patient reports she is still having pain in the back of her knee.  States she has been doing well with home exercises.    Objective     See Exercise, Manual, and Modality Logs for complete treatment.       Assessment/Plan  Patient performed exercise program with progress parameters and exercises.  She tolerated progression well with no adverse symptoms.  She responded positively to manual therapy with improved muscle tone and flexibility as well as decreased TTP.  She had improved comfort with walking following treatment as well.  Will continue to progress as tolerated.  Progress per Plan of Care           Timed:         Manual Therapy:    12     mins  00233;     Therapeutic Exercise:    18     mins  50892;    Neuromuscular Oscar:        mins  28132;    Therapeutic Activity:          mins  80177;     Gait Training:           mins  20623;     Ultrasound:          mins  10389;    Ionto                                  mins  54007  Self Care                            mins  66105  Canalith Repos         mins  74393  Orthotic MGMT/Train         mins  36390    Un-Timed:  Electrical Stimulation:         mins  84434 ( );  Dry Needling:          mins   49847 self-pay;  Dry Needling:          mins  37027 self-pay  Traction          mins  31650  Group Therapy:          mins  19013;    Timed Treatment:   30   mins   Total Treatment:     30   mins        PT SIGNATURE: Marie Kelly PT     License Number: [id number]  Electronically signed by Marie Kelly PT, 07/15/25, 3:30 PM EDT

## 2025-07-24 ENCOUNTER — TREATMENT (OUTPATIENT)
Age: 75
End: 2025-07-24
Payer: MEDICARE

## 2025-07-24 DIAGNOSIS — R26.89 IMPAIRED GAIT AND MOBILITY: ICD-10-CM

## 2025-07-24 DIAGNOSIS — M17.12 PRIMARY OSTEOARTHRITIS OF LEFT KNEE: Primary | ICD-10-CM

## 2025-07-24 DIAGNOSIS — R29.898 DECREASED STRENGTH OF LOWER EXTREMITY: ICD-10-CM

## 2025-07-24 DIAGNOSIS — G89.29 CHRONIC PAIN OF LEFT KNEE: ICD-10-CM

## 2025-07-24 DIAGNOSIS — M25.562 CHRONIC PAIN OF LEFT KNEE: ICD-10-CM

## 2025-07-24 DIAGNOSIS — M24.572 EQUINUS CONTRACTURE OF LEFT ANKLE: ICD-10-CM

## 2025-07-24 DIAGNOSIS — Z78.9 DEFICIT IN ACTIVITIES OF DAILY LIVING (ADL): ICD-10-CM

## 2025-07-24 NOTE — PROGRESS NOTES
Physical Therapy Treatment Note  [facility] Physical Therapy [place]   [address] [place] [address]  P: [phone]       F: [phone]      Patient: Lakeisha Gunn   : 1950  Treatment Diagnosis:     ICD-10-CM ICD-9-CM   1. Primary osteoarthritis of left knee  M17.12 715.16   2. Equinus contracture of left ankle  M24.572 718.47   3. Decreased strength of lower extremity  R29.898 729.89   4. Chronic pain of left knee  M25.562 719.46    G89.29 338.29   5. Impaired gait and mobility  R26.89 781.2   6. Deficit in activities of daily living (ADL)  Z78.9 V49.89     Referring practitioner: Solo Priest MD  Date of Initial Visit: Type: THERAPY  Noted: 2025  Today's Date: 2025  Patient seen for 3 sessions           Subjective   Patient reports her knee and foot are feeling better.  States she is still having the pain in the mornings trying to straighten her knee.     Objective     See Exercise, Manual, and Modality Logs for complete treatment.       Assessment/Plan  Patient performed exercise program with progressed exercises for strengthening and flexibility.  She tolerated progression well with no adverse symptoms.  Benefits from cueing for technique and to prevent compensatory patterns.  Progress HEP and provided written instructions for home use.  Progress per Plan of Care           Timed:         Manual Therapy:         mins  93472;     Therapeutic Exercise:    24     mins  99898;    Neuromuscular Oscar:    8    mins  76426;    Therapeutic Activity:          mins  35762;     Gait Training:           mins  83375;     Ultrasound:          mins  53013;    Ionto                                  mins  69364  Self Care                            mins  57127  Canalith Repos         mins  13106  Orthotic MGMT/Train         mins  01609    Un-Timed:  Electrical Stimulation:         mins  19182 ( );  Dry Needling:          mins  36776 self-pay;  Dry Needling:          mins   64658 self-pay  Traction          mins  68273  Group Therapy:          mins  04770;    Timed Treatment:   32   mins   Total Treatment:     42   mins        PT SIGNATURE: Marie Kelly PT     License Number: [id number]  Electronically signed by Marie Kelly PT, 07/24/25, 9:03 AM EDT

## 2025-07-31 ENCOUNTER — TREATMENT (OUTPATIENT)
Age: 75
End: 2025-07-31
Payer: MEDICARE

## 2025-07-31 DIAGNOSIS — M24.572 EQUINUS CONTRACTURE OF LEFT ANKLE: ICD-10-CM

## 2025-07-31 DIAGNOSIS — M25.562 CHRONIC PAIN OF LEFT KNEE: ICD-10-CM

## 2025-07-31 DIAGNOSIS — M17.12 PRIMARY OSTEOARTHRITIS OF LEFT KNEE: Primary | ICD-10-CM

## 2025-07-31 DIAGNOSIS — G89.29 CHRONIC PAIN OF LEFT KNEE: ICD-10-CM

## 2025-07-31 DIAGNOSIS — R26.89 IMPAIRED GAIT AND MOBILITY: ICD-10-CM

## 2025-07-31 DIAGNOSIS — R29.898 DECREASED STRENGTH OF LOWER EXTREMITY: ICD-10-CM

## 2025-07-31 DIAGNOSIS — Z78.9 DEFICIT IN ACTIVITIES OF DAILY LIVING (ADL): ICD-10-CM

## 2025-07-31 NOTE — PROGRESS NOTES
Physical Therapy Re-Assessment  [facility] Physical Therapy [place]   [address] [place] [address]  P: [phone]       F: [phone]        Patient: Lakeisha Gunn   : 1950  Visit Diagnoses:     ICD-10-CM ICD-9-CM   1. Primary osteoarthritis of left knee  M17.12 715.16   2. Equinus contracture of left ankle  M24.572 718.47   3. Decreased strength of lower extremity  R29.898 729.89   4. Chronic pain of left knee  M25.562 719.46    G89.29 338.29   5. Impaired gait and mobility  R26.89 781.2   6. Deficit in activities of daily living (ADL)  Z78.9 V49.89     Referring practitioner: Solo Priest MD  Date of Initial Visit: Type: THERAPY  Noted: 2025  Today's Date: 2025  Patient seen for 4 sessions      Subjective:   Lakeisha Gunn reports:   Subjective Questionnaire: WOMAC: 40/96  Clinical Progress: improved  Home Program Compliance: Yes  Treatment has included: therapeutic exercise, manual therapy, therapeutic activity, and cryotherapy    Subjective   Patient reports her knee has been feeling better.  States she was up and down a ladder the other day which did increase her knee pain.  States she is still having discomfort at night if her knee is bent, the pain wakes her up and she gets relief with soon as she is able to stretch her knee out straight.    Objective     Observations      Prominent veins with TTP.     Palpation   Left   Hypertonic in the distal biceps femoris, distal semimembranosus, distal semitendinosus, lateral gastrocnemius, medial gastrocnemius.  Tenderness of the distal biceps femoris, distal semimembranosus, distal semitendinosus, gluteus medius, lateral gastrocnemius, medial gastrocnemius, piriformis.   Trigger point to TFL.      Tenderness     Left Knee   Tenderness in the pes anserinus and popliteal fossa.      Active Range of Motion   Left Hip   Normal active range of motion     Right Hip   Normal active range of motion  Left Knee   Normal active  range of motion     Right Knee   Normal active range of motion     Patellar Mobility   WNL     Strength/Myotome Testing      Left Hip   Planes of Motion   Abduction: 4     Left Knee   Flexion: 5  Extension: 5  Left knee quadriceps contraction strength: VMO activation WNL.     Right Knee   Flexion: 5  Extension: 5     Tests      Symmetrical SIJ alignment     Left Knee Flexibility Comments:   SLR: 70 degrees     Decreased hip girdle muscle flexibility.     Right Knee Flexibility Comments:   SLR: 90 degrees     Ambulation      Observational Gait   Gait: antalgic and asymmetric      Functional Assessment   Squat   Pain and sitting toward right side.      Single Leg Stance   Left: 3 (Unsteady, knee hyperextension) seconds  Right: 27 (mild LE wobbling) seconds           See Exercise, Manual, and Modality Logs for complete treatment.     Assessment/Plan  Patient presents with improved flexibility and left quadricep/VMO muscle activation.  She responds positively to manual therapy with improved muscle tone and flexibility.  She is tolerating exercise program progression without adverse symptoms and will benefit from continued PT.   Progress toward previous goals: Partially Met    Goal Review   Short Term Goals (2 wks):  1.  Patient will have improved left quad/VMO activation to WNL.-met  2.  Patient will have normalized muscle tone in left hip girdle/LE muscles.-progressing  3.  Patient will have symmetrical SIJ alignment.-met  4.  Patient will be independent in HEP.-met  5.  Patient will have symmetrical functional squat form for improved stability with performance of ADLs/IADLs.-progressing        Long Term Goals (4 wks):  1.  Patient will have increased left hip strength to 5/5 for improved stability with performance of ADLs/IADLs.-progressing  2.  Patient will have left knee strength of 5/5 for improved stability with performance of ADLs/IADLs.-progressing  3.  Patient will have improved WOMAC score of 30/96 or  better.-progressing  4.  Patient will have improved SLS time of 30 seconds for improved stability with performance of ADLs/IADLs.-onging  5.  Patient will have normalized gait pattern.-progressing      Recommendations: Continue as planned  Timeframe: 1 month  Prognosis to achieve goals: good    PT SIGNATURE: Marie Kelly PT     License Number: [id number]  Electronically signed by Marie Kelly PT, 07/31/25, 9:00 AM EDT        Timed:         Manual Therapy:    8     mins  33951;     Therapeutic Exercise:    15     mins  90049;     Neuromuscular Oscar:        mins  12357;    Therapeutic Activity:     10     mins  64417;     Gait Training:           mins  81134;     Ultrasound:          mins  49437;    Ionto                                  mins  55239  Self Care                            mins  90312  Canalith Repos         mins  23552  Orthotic MGMT/Train         mins  38611    Un-Timed:  Electrical Stimulation:         mins  16601 ( );  Dry Needling:          mins  15312 self-pay;  Dry Needling:          mins  53031 self-pay  Traction          mins  12761  Low Eval          mins  13830  Mod Eval          mins  01383  High Eval                            mins  81801  RE Eval                              mins  05478    Timed Treatment:   33   mins   Total Treatment:     33   mins

## 2025-08-11 ENCOUNTER — TREATMENT (OUTPATIENT)
Age: 75
End: 2025-08-11
Payer: MEDICARE

## 2025-08-11 DIAGNOSIS — R29.898 DECREASED STRENGTH OF LOWER EXTREMITY: ICD-10-CM

## 2025-08-11 DIAGNOSIS — M25.562 CHRONIC PAIN OF LEFT KNEE: ICD-10-CM

## 2025-08-11 DIAGNOSIS — R26.89 IMPAIRED GAIT AND MOBILITY: ICD-10-CM

## 2025-08-11 DIAGNOSIS — M24.572 EQUINUS CONTRACTURE OF LEFT ANKLE: ICD-10-CM

## 2025-08-11 DIAGNOSIS — G89.29 CHRONIC PAIN OF LEFT KNEE: ICD-10-CM

## 2025-08-11 DIAGNOSIS — M17.12 PRIMARY OSTEOARTHRITIS OF LEFT KNEE: Primary | ICD-10-CM

## 2025-08-11 DIAGNOSIS — Z78.9 DEFICIT IN ACTIVITIES OF DAILY LIVING (ADL): ICD-10-CM

## 2025-08-11 PROCEDURE — 97112 NEUROMUSCULAR REEDUCATION: CPT | Performed by: PHYSICAL THERAPIST

## 2025-08-11 PROCEDURE — 97110 THERAPEUTIC EXERCISES: CPT | Performed by: PHYSICAL THERAPIST

## 2025-08-14 ENCOUNTER — TREATMENT (OUTPATIENT)
Age: 75
End: 2025-08-14
Payer: MEDICARE

## 2025-08-14 DIAGNOSIS — R26.89 IMPAIRED GAIT AND MOBILITY: ICD-10-CM

## 2025-08-14 DIAGNOSIS — M25.562 CHRONIC PAIN OF LEFT KNEE: ICD-10-CM

## 2025-08-14 DIAGNOSIS — G89.29 CHRONIC PAIN OF LEFT KNEE: ICD-10-CM

## 2025-08-14 DIAGNOSIS — R29.898 DECREASED STRENGTH OF LOWER EXTREMITY: ICD-10-CM

## 2025-08-14 DIAGNOSIS — M24.572 EQUINUS CONTRACTURE OF LEFT ANKLE: ICD-10-CM

## 2025-08-14 DIAGNOSIS — Z78.9 DEFICIT IN ACTIVITIES OF DAILY LIVING (ADL): ICD-10-CM

## 2025-08-14 DIAGNOSIS — M17.12 PRIMARY OSTEOARTHRITIS OF LEFT KNEE: Primary | ICD-10-CM

## 2025-08-21 ENCOUNTER — TREATMENT (OUTPATIENT)
Age: 75
End: 2025-08-21
Payer: MEDICARE

## 2025-08-21 DIAGNOSIS — R26.89 IMPAIRED GAIT AND MOBILITY: ICD-10-CM

## 2025-08-21 DIAGNOSIS — G89.29 CHRONIC PAIN OF LEFT KNEE: ICD-10-CM

## 2025-08-21 DIAGNOSIS — M17.12 PRIMARY OSTEOARTHRITIS OF LEFT KNEE: Primary | ICD-10-CM

## 2025-08-21 DIAGNOSIS — Z78.9 DEFICIT IN ACTIVITIES OF DAILY LIVING (ADL): ICD-10-CM

## 2025-08-21 DIAGNOSIS — M24.572 EQUINUS CONTRACTURE OF LEFT ANKLE: ICD-10-CM

## 2025-08-21 DIAGNOSIS — R29.898 DECREASED STRENGTH OF LOWER EXTREMITY: ICD-10-CM

## 2025-08-21 DIAGNOSIS — M25.562 CHRONIC PAIN OF LEFT KNEE: ICD-10-CM

## (undated) DEVICE — CANN O2 ETCO2 FITS ALL CONN CO2 SMPL A/ 7IN DISP LF

## (undated) DEVICE — VIAL FORMLN CAP 10PCT 20ML

## (undated) DEVICE — JACKT LAB F/R KNIT CUFF/COLR XLG BLU

## (undated) DEVICE — SYRINGE, LUER SLIP, STERILE, 60ML: Brand: MEDLINE

## (undated) DEVICE — GOWN ISOL W/THUMB UNIV BLU BX/15

## (undated) DEVICE — FLEX ADVANTAGE 1500CC: Brand: FLEX ADVANTAGE

## (undated) DEVICE — LN SMPL CO2 SHTRM SD STREAM W/M LUER

## (undated) DEVICE — KT ORCA ORCAPOD DISP STRL

## (undated) DEVICE — ADAPT CLN BIOGUARD AIR/H2O DISP

## (undated) DEVICE — SENSR O2 OXIMAX FNGR A/ 18IN NONSTR

## (undated) DEVICE — Device

## (undated) DEVICE — TUBING, SUCTION, 1/4" X 10', STRAIGHT: Brand: MEDLINE

## (undated) DEVICE — ADAPT CLN SCPE ENDO PORPOISE BX/50 DISP

## (undated) DEVICE — BITEBLOCK OMNI BLOC

## (undated) DEVICE — SNAR POLYP CAPTIVATOR RND STFF 2.4 240CM 10MM 1P/U

## (undated) DEVICE — FRCP BX RADJAW4 NDL 2.8 240CM LG OG BX40